# Patient Record
Sex: MALE | Race: WHITE | Employment: FULL TIME | URBAN - METROPOLITAN AREA
[De-identification: names, ages, dates, MRNs, and addresses within clinical notes are randomized per-mention and may not be internally consistent; named-entity substitution may affect disease eponyms.]

---

## 2017-03-27 ENCOUNTER — OFFICE VISIT (OUTPATIENT)
Dept: NEUROLOGY | Facility: CLINIC | Age: 52
End: 2017-03-27

## 2017-03-27 VITALS
WEIGHT: 156 LBS | HEIGHT: 66 IN | HEART RATE: 91 BPM | DIASTOLIC BLOOD PRESSURE: 80 MMHG | RESPIRATION RATE: 16 BRPM | SYSTOLIC BLOOD PRESSURE: 118 MMHG | OXYGEN SATURATION: 96 % | BODY MASS INDEX: 25.07 KG/M2

## 2017-03-27 DIAGNOSIS — M54.12 CERVICAL RADICULOPATHY: ICD-10-CM

## 2017-03-27 DIAGNOSIS — M75.21 BICEPS TENDONITIS ON RIGHT: ICD-10-CM

## 2017-03-27 DIAGNOSIS — M19.011 PRIMARY OSTEOARTHRITIS OF RIGHT SHOULDER: ICD-10-CM

## 2017-03-27 DIAGNOSIS — G89.29 CHRONIC RIGHT SHOULDER PAIN: Primary | ICD-10-CM

## 2017-03-27 DIAGNOSIS — M75.01 ADHESIVE CAPSULITIS OF RIGHT SHOULDER: ICD-10-CM

## 2017-03-27 DIAGNOSIS — M25.511 CHRONIC RIGHT SHOULDER PAIN: Primary | ICD-10-CM

## 2017-03-27 DIAGNOSIS — M75.111 INCOMPLETE TEAR OF RIGHT ROTATOR CUFF: ICD-10-CM

## 2017-03-27 PROBLEM — M75.101 ROTATOR CUFF TEAR, RIGHT: Status: ACTIVE | Noted: 2017-03-27

## 2017-03-27 PROCEDURE — 20611 DRAIN/INJ JOINT/BURSA W/US: CPT | Performed by: PHYSICAL MEDICINE & REHABILITATION

## 2017-03-27 PROCEDURE — 96372 THER/PROPH/DIAG INJ SC/IM: CPT | Performed by: PHYSICAL MEDICINE & REHABILITATION

## 2017-03-27 PROCEDURE — 99214 OFFICE O/P EST MOD 30 MIN: CPT | Performed by: PHYSICAL MEDICINE & REHABILITATION

## 2017-03-27 RX ORDER — TRIAMCINOLONE ACETONIDE 40 MG/ML
60 INJECTION, SUSPENSION INTRA-ARTICULAR; INTRAMUSCULAR ONCE
Status: COMPLETED | OUTPATIENT
Start: 2017-03-27 | End: 2017-03-27

## 2017-03-27 RX ORDER — LIDOCAINE HYDROCHLORIDE 10 MG/ML
2.5 INJECTION, SOLUTION INFILTRATION; PERINEURAL ONCE
Status: COMPLETED | OUTPATIENT
Start: 2017-03-27 | End: 2017-03-27

## 2017-03-27 RX ADMIN — TRIAMCINOLONE ACETONIDE 60 MG: 40 INJECTION, SUSPENSION INTRA-ARTICULAR; INTRAMUSCULAR at 18:21:00

## 2017-03-27 NOTE — PROGRESS NOTES
Cervical Pain H & P    Chief Complaint:  Patient presents with: Follow - Up: LOV 9/27/16. Pt doing follow up. Pt stated that back problem has gone completely away. Pt still has tingling in toes more on right. Pt is having right shoulder pain.  Right should pain is currently  0/10. The pain is described as a(n) dull sensation. · The patient reports no numbness. · The patient reports no tingling. · There is weakness in right arms. · The pain is worse right shoulder movement.   Especially with adbuction a Exercise Yes    Comment: walking daily     Social History Narrative    The patient does not use an assistive device. .      The patient does live in a home with stairs. Review of Systems      PE:   The patient does appear in his stated age in no Non-tender shoulder palpations.    Right Internal Rotation: moderate-severely limited   Left Internal Rotation: normal   Right External Rotation: moderate-severely limited   Left External Rotation: normal   Shoulder Special Tests: Right Sosa test: Positi

## 2017-03-27 NOTE — PROCEDURES
I did an ultrasound examination of the right shoulder in the office today:   Biceps Tendon:   Is moderately swollen proximally and there is moderate fluid around the tendon distal tot he biceps groove.   Power doppler imaging is positive on the lateral aspe

## 2017-03-27 NOTE — PATIENT INSTRUCTIONS
Refill policies:    • Allow 2 business days for refills; controlled substances may take longer.   • Contact your pharmacy at least 5 days prior to running out of medication and have them send an electronic request or submit request through the “request re your physician has recommended that you have a procedure or additional testing performed. [facility] will contact your insurance carrier to obtain pre-certification or prior authorization.     Unfortunately, ABRAHAM has seen an increas

## 2017-03-28 ENCOUNTER — TELEPHONE (OUTPATIENT)
Dept: NEUROLOGY | Facility: CLINIC | Age: 52
End: 2017-03-28

## 2017-03-28 NOTE — TELEPHONE ENCOUNTER
Called patient to advise insurance was verified and PT is a covered benefit and does not require authorization for initial evaluation, L/M for patient to call back with any questions or concerns

## 2017-03-29 ENCOUNTER — MED REC SCAN ONLY (OUTPATIENT)
Dept: NEUROLOGY | Facility: CLINIC | Age: 52
End: 2017-03-29

## 2017-04-03 ENCOUNTER — TELEPHONE (OUTPATIENT)
Dept: NEUROLOGY | Facility: CLINIC | Age: 52
End: 2017-04-03

## 2017-04-03 NOTE — TELEPHONE ENCOUNTER
Pt called to update Dr Jamil Griffith. Pt has 85% relief of symptoms. Pt has better ROM and less pain. Pt is pleased with right shoulder injection outcome. Dr Jamil Griffith updated.

## 2017-04-11 ENCOUNTER — HOSPITAL ENCOUNTER (OUTPATIENT)
Dept: GENERAL RADIOLOGY | Age: 52
Discharge: HOME | End: 2017-04-11
Attending: ORTHOPAEDIC SURGERY
Payer: COMMERCIAL

## 2017-04-11 DIAGNOSIS — M25.551 RIGHT HIP PAIN: ICD-10-CM

## 2017-04-11 PROCEDURE — 73502 X-RAY EXAM HIP UNI 2-3 VIEWS: CPT

## 2017-04-25 ENCOUNTER — TELEPHONE (OUTPATIENT)
Dept: NEUROLOGY | Facility: CLINIC | Age: 52
End: 2017-04-25

## 2017-04-25 NOTE — TELEPHONE ENCOUNTER
Spoke to patient who states he is doing well post right shoulder steroid injection. Almost complete relief. Patient is currently in Physical Therapy noting great response with improvement in strength and mobility.  Patient would like to know if he should pr

## 2017-07-11 ENCOUNTER — OFFICE VISIT (OUTPATIENT)
Dept: NEUROLOGY | Facility: CLINIC | Age: 52
End: 2017-07-11

## 2017-07-11 VITALS
BODY MASS INDEX: 25.71 KG/M2 | HEIGHT: 66 IN | SYSTOLIC BLOOD PRESSURE: 118 MMHG | DIASTOLIC BLOOD PRESSURE: 76 MMHG | RESPIRATION RATE: 16 BRPM | OXYGEN SATURATION: 96 % | HEART RATE: 100 BPM | WEIGHT: 160 LBS

## 2017-07-11 DIAGNOSIS — M75.01 ADHESIVE CAPSULITIS OF RIGHT SHOULDER: ICD-10-CM

## 2017-07-11 DIAGNOSIS — M16.11 PRIMARY OSTEOARTHRITIS OF RIGHT HIP: ICD-10-CM

## 2017-07-11 DIAGNOSIS — M75.111 INCOMPLETE TEAR OF RIGHT ROTATOR CUFF: ICD-10-CM

## 2017-07-11 DIAGNOSIS — G89.29 CHRONIC RIGHT SHOULDER PAIN: Primary | ICD-10-CM

## 2017-07-11 DIAGNOSIS — M19.011 PRIMARY OSTEOARTHRITIS OF RIGHT SHOULDER: ICD-10-CM

## 2017-07-11 DIAGNOSIS — M25.511 CHRONIC RIGHT SHOULDER PAIN: Primary | ICD-10-CM

## 2017-07-11 DIAGNOSIS — M25.551 RIGHT HIP PAIN: ICD-10-CM

## 2017-07-11 PROCEDURE — 99214 OFFICE O/P EST MOD 30 MIN: CPT | Performed by: PHYSICAL MEDICINE & REHABILITATION

## 2017-07-11 NOTE — PROGRESS NOTES
Cervical Pain H & P    Chief Complaint:  Patient presents with: Follow - Up: LOV 03/27/17 right shoulder steroid injection with 85% improvement with follow up physical therapy with improved range of motion.  Pt state that playing golf also increased ROM, P Nephew       Social History     Social History  Social History   Marital status:   Spouse name: N/A    Years of education: N/A  Number of children: N/A     Occupational History  None on file     Social History Main Topics   Smoking status: Current E Vascular upper extremity:   Right radial pulses: 2+   Left radial pulses: 2+      Neurological Upper Extremity:    Light Touch: Intact in Bilateral upper extremities. Pin Prick: Not tested. UE Muscle Strength:  All Upper Extremity strength measureme

## 2017-07-11 NOTE — PATIENT INSTRUCTIONS
Refill policies:    • Allow 2 business days for refills; controlled substances may take longer.   • Contact your pharmacy at least 5 days prior to running out of medication and have them send an electronic request or submit request through the “request re your physician has recommended that you have a procedure or additional testing performed. [facility] will contact your insurance carrier to obtain pre-certification or prior authorization.     Unfortunately, ABRAHAM has seen an increas

## 2017-08-07 ENCOUNTER — TELEPHONE (OUTPATIENT)
Dept: INTERNAL MEDICINE CLINIC | Facility: CLINIC | Age: 52
End: 2017-08-07

## 2017-08-07 NOTE — TELEPHONE ENCOUNTER
Tasked to phone room (rafael) to assist with scheduling appt with Dr Booker Ochoa for focus visit 8/8/17 @ 11:30am ADO right calf injury. Pts' wife Nadya Layman informed of appt time and verbalized understanding. Pt is driving home from work now.  8/14/17 appt cance

## 2017-08-07 NOTE — TELEPHONE ENCOUNTER
Actions Requested: pt only wants to see Dr. Adriana Louis. No openings until Fri.   Can he possibly be added Tues 08/08 after 11:30am?   Problem: injured rt calf  Onset and Timing: one week ago  Associated Symptoms: pt has pain and swelling in posterior rt calf a

## 2017-08-08 ENCOUNTER — HOSPITAL ENCOUNTER (OUTPATIENT)
Dept: ULTRASOUND IMAGING | Facility: HOSPITAL | Age: 52
Discharge: HOME | End: 2017-08-08
Attending: FAMILY MEDICINE
Payer: COMMERCIAL

## 2017-08-08 ENCOUNTER — OFFICE VISIT (OUTPATIENT)
Dept: FAMILY MEDICINE CLINIC | Facility: CLINIC | Age: 52
End: 2017-08-08

## 2017-08-08 VITALS
DIASTOLIC BLOOD PRESSURE: 85 MMHG | HEIGHT: 66 IN | TEMPERATURE: 98 F | BODY MASS INDEX: 26.03 KG/M2 | SYSTOLIC BLOOD PRESSURE: 124 MMHG | WEIGHT: 162 LBS | HEART RATE: 87 BPM

## 2017-08-08 DIAGNOSIS — M79.89 CALF SWELLING: ICD-10-CM

## 2017-08-08 DIAGNOSIS — M79.89 CALF SWELLING: Primary | ICD-10-CM

## 2017-08-08 DIAGNOSIS — M79.89 RIGHT LEG SWELLING: ICD-10-CM

## 2017-08-08 PROCEDURE — 99214 OFFICE O/P EST MOD 30 MIN: CPT | Performed by: FAMILY MEDICINE

## 2017-08-08 PROCEDURE — 93971 EXTREMITY STUDY: CPT | Performed by: FAMILY MEDICINE

## 2017-08-08 PROCEDURE — 99212 OFFICE O/P EST SF 10 MIN: CPT | Performed by: FAMILY MEDICINE

## 2017-08-08 NOTE — PROGRESS NOTES
Patient ID: Vincent Caro is a 45year old male. HPI  Patient presents with:  Swelling: right leg     Actions Requested: pt only wants to see Dr. Yosvany Cota. No openings until Fri.   Can he possibly be added Tues 08/08 after 11:30am?   Problem: injured tendon tear\"   • Lower back pain        Past Surgical History:  2007: Sami Narayan OF SHOULDER  6/2014: BACK SURGERY      Comment: cortisone injection  No date: MUSCULOSKELETAL SURGERY UNLISTED      Comment: per [employer]:  \"repair (of bicep and tendon and patient/family member understands and agrees to plan.          Mina Kelly DO  8/8/2017

## 2017-08-14 ENCOUNTER — TELEPHONE (OUTPATIENT)
Dept: FAMILY MEDICINE CLINIC | Facility: CLINIC | Age: 52
End: 2017-08-14

## 2017-08-14 NOTE — TELEPHONE ENCOUNTER
Actions Requested: Pain med/advice  Problem: leg pain from muscle strain  Onset and Timing: yesterday  Associated Symptoms: pain in right glute/hamstring  Aggravating by: standing, walking  Alleviated by: n/a  Triage Note: Pt states he saw Dr Joel Naranjo on 8/8

## 2017-08-22 ENCOUNTER — OFFICE VISIT (OUTPATIENT)
Dept: FAMILY MEDICINE CLINIC | Facility: CLINIC | Age: 52
End: 2017-08-22

## 2017-08-22 VITALS
BODY MASS INDEX: 26.39 KG/M2 | WEIGHT: 164.19 LBS | HEART RATE: 80 BPM | DIASTOLIC BLOOD PRESSURE: 82 MMHG | SYSTOLIC BLOOD PRESSURE: 120 MMHG | RESPIRATION RATE: 12 BRPM | HEIGHT: 66 IN | TEMPERATURE: 98 F

## 2017-08-22 DIAGNOSIS — I82.401 LEG DVT (DEEP VENOUS THROMBOEMBOLISM), ACUTE, RIGHT (HCC): Primary | ICD-10-CM

## 2017-08-22 DIAGNOSIS — S76.311A RIGHT HAMSTRING MUSCLE STRAIN, INITIAL ENCOUNTER: ICD-10-CM

## 2017-08-22 DIAGNOSIS — M71.21 BAKER CYST, RIGHT: ICD-10-CM

## 2017-08-22 PROCEDURE — 99214 OFFICE O/P EST MOD 30 MIN: CPT | Performed by: FAMILY MEDICINE

## 2017-08-22 PROCEDURE — 99212 OFFICE O/P EST SF 10 MIN: CPT | Performed by: FAMILY MEDICINE

## 2017-08-22 NOTE — PROGRESS NOTES
Patient ID: Maxine Sauceda is a 45year old male. HPI  Patient presents with: Follow - Up: blood clot right leg    Interpretation   PROCEDURE:            US VENOUS DOPPLER LEG RIGHT-DIAG IMG (CPT=93971)     COMPARISON:           None.      INDICATION 3.2 oz (74.5 kg)  08/08/17 : 162 lb (73.5 kg)  07/11/17 : 160 lb (72.6 kg)  03/27/17 : 156 lb (70.8 kg)  09/27/16 : 162 lb (73.5 kg)  07/12/16 : 162 lb 12.8 oz (73.8 kg)      Body mass index is 26.5 kg/m².     BP Readings from Last 6 Encounters:  08/22/17 : issue tuberosity itself. ASSESSMENT/PLAN:     Diagnoses and all orders for this visit:    Leg DVT (deep venous thromboembolism), acute, right (HCC)  -     Rivaroxaban (XARELTO) 20 MG Oral Tab; Take 1 tablet (20 mg total) by mouth daily with food.   He will

## 2017-11-16 ENCOUNTER — HOSPITAL ENCOUNTER (OUTPATIENT)
Dept: ULTRASOUND IMAGING | Facility: HOSPITAL | Age: 52
Discharge: HOME | End: 2017-11-16
Attending: FAMILY MEDICINE
Payer: COMMERCIAL

## 2017-11-16 DIAGNOSIS — I82.4Y1 DEEP VEIN THROMBOSIS (DVT) OF PROXIMAL VEIN OF RIGHT LOWER EXTREMITY, UNSPECIFIED CHRONICITY (HCC): ICD-10-CM

## 2017-11-16 PROCEDURE — 93971 EXTREMITY STUDY: CPT | Performed by: FAMILY MEDICINE

## 2017-11-24 ENCOUNTER — OFFICE VISIT (OUTPATIENT)
Dept: FAMILY MEDICINE CLINIC | Facility: CLINIC | Age: 52
End: 2017-11-24

## 2017-11-24 VITALS
HEIGHT: 66 IN | DIASTOLIC BLOOD PRESSURE: 71 MMHG | SYSTOLIC BLOOD PRESSURE: 109 MMHG | HEART RATE: 76 BPM | RESPIRATION RATE: 12 BRPM | TEMPERATURE: 98 F | BODY MASS INDEX: 27.74 KG/M2 | WEIGHT: 172.63 LBS

## 2017-11-24 DIAGNOSIS — I82.401 LEG DVT (DEEP VENOUS THROMBOEMBOLISM), ACUTE, RIGHT (HCC): Primary | ICD-10-CM

## 2017-11-24 DIAGNOSIS — M71.21 BAKER CYST, RIGHT: ICD-10-CM

## 2017-11-24 DIAGNOSIS — Z23 NEED FOR VACCINATION: ICD-10-CM

## 2017-11-24 PROCEDURE — 99212 OFFICE O/P EST SF 10 MIN: CPT | Performed by: FAMILY MEDICINE

## 2017-11-24 PROCEDURE — 90471 IMMUNIZATION ADMIN: CPT | Performed by: FAMILY MEDICINE

## 2017-11-24 PROCEDURE — 99214 OFFICE O/P EST MOD 30 MIN: CPT | Performed by: FAMILY MEDICINE

## 2017-11-24 PROCEDURE — 90715 TDAP VACCINE 7 YRS/> IM: CPT | Performed by: FAMILY MEDICINE

## 2017-11-24 NOTE — PROGRESS NOTES
Patient ID: Missy Padilla is a 45year old male.     HPI  Patient presents with:  Test Results: ultrasound done on 11-16-17  Pain: right lower leg     Interpretation   PROCEDURE:  US VENOUS DOPPLER LEG RIGHT-DIAG [facility] (CPT=93971)     COMPARISON: Maxine 27.86 kg/m².     BP Readings from Last 6 Encounters:  11/24/17 : 109/71  08/22/17 : 120/82  08/08/17 : 124/85  07/11/17 : 118/76  03/27/17 : 118/80  09/27/16 : 120/80        Review of Systems      Past Medical History:   Diagnosis Date   • Arthritis    • In he has but he may need some hypercoagulability workup but I will leave this up to the hematologist.  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   He does relate that he had right hip surg

## 2017-11-30 ENCOUNTER — OFFICE VISIT (OUTPATIENT)
Dept: HEMATOLOGY/ONCOLOGY | Facility: HOSPITAL | Age: 52
End: 2017-11-30
Attending: INTERNAL MEDICINE
Payer: COMMERCIAL

## 2017-11-30 VITALS
WEIGHT: 169 LBS | RESPIRATION RATE: 18 BRPM | SYSTOLIC BLOOD PRESSURE: 132 MMHG | HEIGHT: 66 IN | DIASTOLIC BLOOD PRESSURE: 88 MMHG | TEMPERATURE: 98 F | HEART RATE: 88 BPM | BODY MASS INDEX: 27.16 KG/M2

## 2017-11-30 DIAGNOSIS — I82.401 LEG DVT (DEEP VENOUS THROMBOEMBOLISM), ACUTE, RIGHT (HCC): ICD-10-CM

## 2017-11-30 DIAGNOSIS — M15.9 PRIMARY OSTEOARTHRITIS INVOLVING MULTIPLE JOINTS: Primary | ICD-10-CM

## 2017-11-30 PROCEDURE — 99212 OFFICE O/P EST SF 10 MIN: CPT | Performed by: INTERNAL MEDICINE

## 2017-11-30 PROCEDURE — 99244 OFF/OP CNSLTJ NEW/EST MOD 40: CPT | Performed by: INTERNAL MEDICINE

## 2017-11-30 RX ORDER — FLUTICASONE PROPIONATE 50 MCG
SPRAY, SUSPENSION (ML) NASAL DAILY
COMMUNITY
End: 2018-01-15

## 2017-11-30 RX ORDER — COVID-19 ANTIGEN TEST
220 KIT MISCELLANEOUS
COMMUNITY
End: 2018-04-18

## 2017-11-30 NOTE — PROGRESS NOTES
Hematology Consultation Note    Patient Name: Abigail Zaldivar   YOB: 1965   Medical Record Number: [id number]   CSN: [id number]   Consulting Physician: Jenny Blount MD  Referring Physician(s): Francisco Leach  Date of Consultation: 11/30/2017 dyspnea, chest pain, change in bowel or bladder habits. Patient has significant osteoarthritis in several joints mentions using Tylenol as needed when he is on Xarelto therapy secondary to increased bleeding risk with use of NSAIDs.     Past Medical Histor file     Other Topics Concern    Caffeine Concern Yes    Comment: 2 cups coffee daily    Sleep Concern Yes    Exercise Yes    Comment: walking daily     Social History Narrative    Mirlande Joseph is  x 31 yrs; Higinio Avery. Father of 2 adult daughters.  He works as a intact. Oropharynx is clear. Neck:  No palpable lymphadenopathy. Neck is supple. Lymphatics: There is no palpable lymphadenopathy throughout in the cervical, supraclavicular, axillary, or inguinal regions. Chest: Clear to auscultation.  No wheezes or ra (VTE)    --Reviewed history with patient which shows this clot was unprovoked  --Therefore recommending the patient is treated with at least 6 months of anticoagulation.     --Recommending he restarts rivaroxaban (Xarelto) 20 mg daily for an additional 3 mo family. Jose Luis Díaz MD  [facility]  [address], [facility], [facility]

## 2017-12-19 ENCOUNTER — TELEPHONE (OUTPATIENT)
Dept: NEUROLOGY | Facility: CLINIC | Age: 52
End: 2017-12-19

## 2017-12-19 NOTE — TELEPHONE ENCOUNTER
Per Dr Niya Chow order placed for tramadol 50mg q 8hr prn pain #[id number] 0 refill. Per Dr Niya Chow pt was told that if right shoulder pain worsen and he would like injection Dr Niya Chow may be able to do while still on Xarelto. Pt will call if injection needed.  Pt was

## 2017-12-19 NOTE — TELEPHONE ENCOUNTER
Pt recalled. Pt asking if Dr Rosie Victor can order pain med for right shoulder. Pt would like to have right shoulder injection but has been placed on Xarelto for second time for RLE DVT.  Pt is unable to come off Xarelto at this time so is unable to received an i

## 2017-12-20 RX ORDER — TRAMADOL HYDROCHLORIDE 50 MG/1
50 TABLET ORAL EVERY 8 HOURS PRN
Qty: 90 TABLET | Refills: 0 | OUTPATIENT
Start: 2017-12-20 | End: 2018-04-18

## 2018-01-13 ENCOUNTER — PATIENT MESSAGE (OUTPATIENT)
Dept: FAMILY MEDICINE CLINIC | Facility: CLINIC | Age: 53
End: 2018-01-13

## 2018-01-16 RX ORDER — FLUTICASONE PROPIONATE 50 MCG
2 SPRAY, SUSPENSION (ML) NASAL DAILY
Qty: 3 INHALER | Refills: 0 | Status: SHIPPED | OUTPATIENT
Start: 2018-01-16 | End: 2018-05-08

## 2018-01-16 NOTE — TELEPHONE ENCOUNTER
From: Trang Stover  To: Alesia Zhang DO  Sent: 1/13/2018 8:52 AM CST  Subject: Prescription Question    Hi Dr. Morenita Arora I am in need of my prescription for my nose spray - Fluticasone Propionate 50mcg/ACT Nasal Suspension.  If you could send the prescr

## 2018-01-16 NOTE — TELEPHONE ENCOUNTER
Dr. Dell Szymanski, please see pt's request for refill and pended med. Med is listed as historical on MAR.

## 2018-02-09 ENCOUNTER — HOSPITAL ENCOUNTER (OUTPATIENT)
Dept: ULTRASOUND IMAGING | Facility: HOSPITAL | Age: 53
Discharge: HOME | End: 2018-02-09
Attending: INTERNAL MEDICINE
Payer: COMMERCIAL

## 2018-02-09 DIAGNOSIS — I82.401 LEG DVT (DEEP VENOUS THROMBOEMBOLISM), ACUTE, RIGHT (HCC): ICD-10-CM

## 2018-02-09 PROCEDURE — 93971 EXTREMITY STUDY: CPT | Performed by: INTERNAL MEDICINE

## 2018-02-28 ENCOUNTER — OFFICE VISIT (OUTPATIENT)
Dept: HEMATOLOGY/ONCOLOGY | Facility: HOSPITAL | Age: 53
End: 2018-02-28
Attending: INTERNAL MEDICINE
Payer: COMMERCIAL

## 2018-02-28 ENCOUNTER — TELEPHONE (OUTPATIENT)
Dept: NEUROLOGY | Facility: CLINIC | Age: 53
End: 2018-02-28

## 2018-02-28 VITALS
HEART RATE: 80 BPM | SYSTOLIC BLOOD PRESSURE: 122 MMHG | WEIGHT: 168 LBS | HEIGHT: 66 IN | TEMPERATURE: 98 F | RESPIRATION RATE: 16 BRPM | BODY MASS INDEX: 27 KG/M2 | DIASTOLIC BLOOD PRESSURE: 78 MMHG

## 2018-02-28 DIAGNOSIS — M25.511 CHRONIC RIGHT SHOULDER PAIN: Primary | ICD-10-CM

## 2018-02-28 DIAGNOSIS — M19.011 PRIMARY OSTEOARTHRITIS OF RIGHT SHOULDER: ICD-10-CM

## 2018-02-28 DIAGNOSIS — M75.111 INCOMPLETE TEAR OF RIGHT ROTATOR CUFF: ICD-10-CM

## 2018-02-28 DIAGNOSIS — I82.401 LEG DVT (DEEP VENOUS THROMBOEMBOLISM), ACUTE, RIGHT (HCC): ICD-10-CM

## 2018-02-28 DIAGNOSIS — G89.29 CHRONIC RIGHT SHOULDER PAIN: Primary | ICD-10-CM

## 2018-02-28 PROCEDURE — 99214 OFFICE O/P EST MOD 30 MIN: CPT | Performed by: INTERNAL MEDICINE

## 2018-02-28 NOTE — TELEPHONE ENCOUNTER
Pt came in requesting a cortisone shot in his right shoulder, wanting to schedule an injection please advise

## 2018-02-28 NOTE — TELEPHONE ENCOUNTER
Patient states he is having right shoulder pain with limited mobility which worsened around Rene. Pain score of 10/10 that is intermittent especially when laying.    Taking otc Tylenol prn  LOV 7/11/17  Had right shoulder injection on 3/27/17 with 80%

## 2018-02-28 NOTE — PROGRESS NOTES
Hematology Progress Note    Patient Name: Ru Telles   YOB: 1965   Medical Record Number: [id number]   CSN: [id number]   Consulting Physician: Jorge Ritchie MD  Referring Physician(s): Lavern Saavedra  Date of Visit: 02/28/2018       [facility] pain, change in bowel or bladder habits. Patient has significant osteoarthritis in several joints mentions using Tylenol as needed when he is on Xarelto therapy secondary to increased bleeding risk with use of NSAIDs. Interval History:   Arsalan Fang presents t Number of children: N/A     Occupational History  None on file     Social History Main Topics   Smoking status: Current Every Day Smoker     Types: Cigars    Smokeless tobacco: Former User    Types: Chew    Comment: 1 cigar a day.  quit chewing tobacco in 2 review of systems was completed. Pertinent positives and negatives noted in the the HPI.      Vital Signs:  /78 (BP Location: Left arm, Patient Position: Sitting, Cuff Size: adult)   Pulse 80   Temp 98.2 °F (36.8 °C) (Oral)   Resp 16   Ht 1.676 m (5' nearly occlusive thrombus involving the midportion of a single of 2 paired right peroneal veins. This segment demonstrates incomplete compression. The second right peroneal vein is patent.  The femoral and popliteal veins   appear normal.  Normal flow was d weeks to pursue accurate testing    --Patient has been tolerating anticoagulation with Xarelto quite well, mild bruising associated with carpentry work.   We have refilled his prescription of rivaroxaban   --Informed the patient that he may hold his Xarelto

## 2018-03-01 NOTE — TELEPHONE ENCOUNTER
Patient informed of Dr. Javier Rizzo message verbalizing understanding and was transferred to the front office staff to schedule appt.

## 2018-03-01 NOTE — TELEPHONE ENCOUNTER
Called [facility] BS for authorization of approval of right shoulder injection cpt codes 55295, [id number], [id number]. Talked to Annemarie Mitchell Dr. who states no authorization is required. Reference # [id number] Will  inform Nursing.

## 2018-04-18 ENCOUNTER — OFFICE VISIT (OUTPATIENT)
Dept: NEUROLOGY | Facility: CLINIC | Age: 53
End: 2018-04-18

## 2018-04-18 ENCOUNTER — TELEPHONE (OUTPATIENT)
Dept: NEUROLOGY | Facility: CLINIC | Age: 53
End: 2018-04-18

## 2018-04-18 VITALS
HEIGHT: 66 IN | BODY MASS INDEX: 26.52 KG/M2 | DIASTOLIC BLOOD PRESSURE: 80 MMHG | RESPIRATION RATE: 16 BRPM | WEIGHT: 165 LBS | HEART RATE: 88 BPM | SYSTOLIC BLOOD PRESSURE: 102 MMHG

## 2018-04-18 VITALS
WEIGHT: 165 LBS | RESPIRATION RATE: 16 BRPM | DIASTOLIC BLOOD PRESSURE: 72 MMHG | BODY MASS INDEX: 27 KG/M2 | HEART RATE: 72 BPM | SYSTOLIC BLOOD PRESSURE: 106 MMHG

## 2018-04-18 DIAGNOSIS — M19.011 PRIMARY OSTEOARTHRITIS OF RIGHT SHOULDER: ICD-10-CM

## 2018-04-18 DIAGNOSIS — G89.29 CHRONIC RIGHT SHOULDER PAIN: Primary | ICD-10-CM

## 2018-04-18 DIAGNOSIS — M75.111 INCOMPLETE TEAR OF RIGHT ROTATOR CUFF: ICD-10-CM

## 2018-04-18 DIAGNOSIS — M25.511 CHRONIC RIGHT SHOULDER PAIN: Primary | ICD-10-CM

## 2018-04-18 DIAGNOSIS — M75.01 ADHESIVE CAPSULITIS OF RIGHT SHOULDER: ICD-10-CM

## 2018-04-18 DIAGNOSIS — I82.401 LEG DVT (DEEP VENOUS THROMBOEMBOLISM), ACUTE, RIGHT (HCC): ICD-10-CM

## 2018-04-18 DIAGNOSIS — M75.21 BICEPS TENDONITIS ON RIGHT: ICD-10-CM

## 2018-04-18 DIAGNOSIS — G56.21 ULNAR NEUROPATHY OF RIGHT UPPER EXTREMITY: ICD-10-CM

## 2018-04-18 DIAGNOSIS — M54.12 CERVICAL RADICULOPATHY: ICD-10-CM

## 2018-04-18 PROBLEM — M75.101 ROTATOR CUFF TEAR, RIGHT: Status: RESOLVED | Noted: 2017-03-27 | Resolved: 2018-04-18

## 2018-04-18 PROCEDURE — 99214 OFFICE O/P EST MOD 30 MIN: CPT | Performed by: PHYSICAL MEDICINE & REHABILITATION

## 2018-04-18 PROCEDURE — 20611 DRAIN/INJ JOINT/BURSA W/US: CPT | Performed by: PHYSICAL MEDICINE & REHABILITATION

## 2018-04-18 RX ORDER — TRIAMCINOLONE ACETONIDE 40 MG/ML
60 INJECTION, SUSPENSION INTRA-ARTICULAR; INTRAMUSCULAR ONCE
Status: COMPLETED | OUTPATIENT
Start: 2018-04-18 | End: 2018-04-18

## 2018-04-18 RX ORDER — LIDOCAINE HYDROCHLORIDE 10 MG/ML
2.5 INJECTION, SOLUTION INFILTRATION; PERINEURAL ONCE
Status: COMPLETED | OUTPATIENT
Start: 2018-04-18 | End: 2018-04-18

## 2018-04-18 RX ORDER — ACETAMINOPHEN 325 MG/1
325 TABLET ORAL 2 TIMES DAILY PRN
COMMUNITY
End: 2020-01-06

## 2018-04-18 NOTE — PROGRESS NOTES
Cervical Pain H & P    Chief Complaint:  Patient presents with:  Shoulder Pain: Patient presents for follow up on right shoulder pain, states the pain radiates to his right fingers.  Patient states the pain is worse at night, notices numbness and tingling i Surgical History:  2007: Lavell Renae OF SHOULDER  6/2014: BACK SURGERY      Comment: cortisone injection  05/09/2016: [facility] CLOSED [place] POST HIP ARTHROPLASTY [address] Right  No date: MUSCULOSKELETAL SURGERY UNLISTED      Comment: per [employer]:  Lynn Valdivia ( patient is alert and oriented x 3. The patient has a normal affect and mood. Respiratory:  No acute respiratory distress. Patient does not have a cough. HEENT:  Extraocular muscles are intact. There is no kern icterus.  Pupils are equal, round, and today.  (see procedure note)    The patient will continue with his home exercise program.    I will hold on doing an EMG/NCS of the right arm for now, but he might need this in the future.     The patient will follow up in 2-3 months, but the patient will c

## 2018-04-18 NOTE — PROCEDURES
The patient is here for a right shoulder injection done under ultrasound guidance. Under ultrasound guidance the right posterior glenohumeral joint was identified and a ghazala was placed on the patient's skin. The skin was cleaned with alcohol swabs x 3 and

## 2018-04-18 NOTE — TELEPHONE ENCOUNTER
[address] [place] [address] for authorization of approval of right shoulder injection cpt code 01037, [id number]. Talked to [facility]. who states no authorization is required. Reference # [id number]. Will  inform Nursing.  Pt. Is scheduled for procedure to

## 2018-04-18 NOTE — PATIENT INSTRUCTIONS
As of October 6th 2014, the Drug Enforcement Agency [facility]) is reclassifying all hydrocodone combination medications from Schedule III to Schedule II. This includes medications such as Norco, Vicodin, Lortab, Zohydro, and Vicoprofen.     What this means for y will do a right shoulder injection once I have insurance approval.    The patient will continue with his home exercise program.    I will hold on doing an EMG/NCS of the right arm for now, but he might need this in the future.     The patient will follow up

## 2018-04-19 ENCOUNTER — MED REC SCAN ONLY (OUTPATIENT)
Dept: NEUROLOGY | Facility: CLINIC | Age: 53
End: 2018-04-19

## 2018-05-08 RX ORDER — FLUTICASONE PROPIONATE 50 MCG
SPRAY, SUSPENSION (ML) NASAL
Qty: 48 G | Refills: 0 | Status: SHIPPED | OUTPATIENT
Start: 2018-05-08 | End: 2018-08-01

## 2018-05-08 NOTE — TELEPHONE ENCOUNTER
Refill Protocol Appointment Criteria  · Appointment scheduled in the past 12 months or in the next 3 months  Recent Outpatient Visits            2 weeks ago Chronic right shoulder pain    JAMILA Carmichael, [address], [address] [facility], [facility]

## 2018-05-31 DIAGNOSIS — I82.401 LEG DVT (DEEP VENOUS THROMBOEMBOLISM), ACUTE, RIGHT (HCC): ICD-10-CM

## 2018-05-31 RX ORDER — RIVAROXABAN 20 MG/1
TABLET, FILM COATED ORAL
Qty: 90 TABLET | Refills: 0 | Status: SHIPPED | OUTPATIENT
Start: 2018-05-31 | End: 2018-08-28 | Stop reason: ALTCHOICE

## 2018-08-01 RX ORDER — FLUTICASONE PROPIONATE 50 MCG
SPRAY, SUSPENSION (ML) NASAL
Qty: 48 G | Refills: 0 | Status: SHIPPED | OUTPATIENT
Start: 2018-08-01 | End: 2020-01-06

## 2018-08-02 NOTE — TELEPHONE ENCOUNTER
Refill Protocol Appointment Criteria  · Appointment scheduled in the past 12 months or in the next 3 months  Recent Outpatient Visits            3 months ago Chronic right shoulder pain    JAMILA Carmichael, [address], [address]

## 2018-08-20 ENCOUNTER — HOSPITAL ENCOUNTER (OUTPATIENT)
Dept: ULTRASOUND IMAGING | Facility: HOSPITAL | Age: 53
Discharge: HOME | End: 2018-08-20
Attending: INTERNAL MEDICINE
Payer: COMMERCIAL

## 2018-08-20 DIAGNOSIS — I82.401 LEG DVT (DEEP VENOUS THROMBOEMBOLISM), ACUTE, RIGHT (HCC): ICD-10-CM

## 2018-08-20 PROCEDURE — 93971 EXTREMITY STUDY: CPT | Performed by: INTERNAL MEDICINE

## 2018-08-28 ENCOUNTER — OFFICE VISIT (OUTPATIENT)
Dept: HEMATOLOGY/ONCOLOGY | Facility: HOSPITAL | Age: 53
End: 2018-08-28
Attending: INTERNAL MEDICINE
Payer: COMMERCIAL

## 2018-08-28 VITALS
HEART RATE: 87 BPM | WEIGHT: 172 LBS | BODY MASS INDEX: 27.64 KG/M2 | RESPIRATION RATE: 16 BRPM | TEMPERATURE: 99 F | SYSTOLIC BLOOD PRESSURE: 111 MMHG | DIASTOLIC BLOOD PRESSURE: 88 MMHG | HEIGHT: 66 IN

## 2018-08-28 DIAGNOSIS — I82.401 LEG DVT (DEEP VENOUS THROMBOEMBOLISM), ACUTE, RIGHT (HCC): Primary | ICD-10-CM

## 2018-08-28 DIAGNOSIS — M15.9 PRIMARY OSTEOARTHRITIS INVOLVING MULTIPLE JOINTS: ICD-10-CM

## 2018-08-28 PROCEDURE — 99214 OFFICE O/P EST MOD 30 MIN: CPT | Performed by: INTERNAL MEDICINE

## 2018-08-28 NOTE — PATIENT INSTRUCTIONS
PLEASE GO FOR BLOOD TESTING ON 9/11/18; DO NOT HAVE LABS DRAWN ANY SOONER AS THEY MAY BE FALSELY AFFECTED BY RECENT XARELTO USE

## 2018-08-28 NOTE — PROGRESS NOTES
Hematology Progress Note    Patient Name: Venkat Villar   YOB: 1965   Medical Record Number: [id number]   CSN: [id number]   Consulting Physician: Milady Luna MD  Referring Physician(s): Gino Kerr  Date of Visit: 08/28/2018       [facility] pain, change in bowel or bladder habits. Patient has significant osteoarthritis in several joints mentions using Tylenol as needed when he is on Xarelto therapy secondary to increased bleeding risk with use of NSAIDs. Interval History:   Fide Frederick presents t status:   Spouse name: N/A    Years of education: N/A  Number of children: N/A     Occupational History  None on file     Social History Main Topics   Smoking status: Current Every Day Smoker     Types: Cigars    Smokeless tobacco: Former User    Ty +Osteoarthritis affecting both hands and shoulder regions  Neurological: Negative for headaches, dizziness, speech problems, gait problems and weakness. A comprehensive 14 point review of systems was completed.   Pertinent positives and negatives noted i 07:20 AM   CO2 31 03/14/2015 07:20 AM   AST 32 03/14/2015 07:20 AM   ALT 34 03/14/2015 07:20 AM     U/S venous doppler right leg 8/20/18    FINDINGS:        The femoral and popliteal veins appear normal.  Normal flow was demonstrated with color and pulsed testing    --Patient has been tolerating anticoagulation with Xarelto quite well, mild bruising associated with carpentry work, but no major bleeding complications. Pt should be able to use this drug in the future if needed.       --Discussed with Ronn guerra

## 2018-08-29 RX ORDER — TRAMADOL HYDROCHLORIDE 50 MG/1
50 TABLET ORAL EVERY 8 HOURS PRN
Qty: 90 TABLET | Refills: 0
Start: 2018-08-29

## 2018-08-29 NOTE — TELEPHONE ENCOUNTER
From: Basil Paredes  Sent: 8/29/2018 12:55 PM CDT  Subject: Medication Renewal Request    Dewayne Doty. Opaldena would like a refill of the following medications:     TraMADol HCl 50 MG Oral Tab [Jerad Feliz MD]    Preferred pharmacy: [address] [facility]    Comment:

## 2018-08-29 NOTE — TELEPHONE ENCOUNTER
From: Sara Melgar  Sent: 8/29/2018 12:41 PM CDT  Subject: Medication Renewal Request    David Moore. Julia would like a refill of the following medications:     TraMADol HCl 50 MG Oral Tab [Jerad Feliz MD]    Preferred pharmacy: [address] [place] [address]

## 2018-08-29 NOTE — TELEPHONE ENCOUNTER
Refill request for tramadol 50 mg, take 1 tab every 8 hrs as needed, #90, no refills    LOV: 4/18/18  NOV: none  [facility]

## 2018-08-30 RX ORDER — TRAMADOL HYDROCHLORIDE 50 MG/1
50 TABLET ORAL EVERY 8 HOURS PRN
Qty: 90 TABLET | Refills: 0 | OUTPATIENT
Start: 2018-08-30 | End: 2020-01-06

## 2018-08-31 NOTE — TELEPHONE ENCOUNTER
Contacted patient and informed him Dr. Carmen Bourgeois would like for him to schedule follow up appt. Patient verbalized understanding and transferred to the front office staff to schedule.  Patient request rx be called in to [facility] on file    Tramadol 50mg q8h prn elayne

## 2018-09-11 ENCOUNTER — LAB ENCOUNTER (OUTPATIENT)
Dept: LAB | Facility: HOSPITAL | Age: 53
End: 2018-09-11
Attending: INTERNAL MEDICINE
Payer: COMMERCIAL

## 2018-09-11 DIAGNOSIS — I82.401 LEG DVT (DEEP VENOUS THROMBOEMBOLISM), ACUTE, RIGHT (HCC): ICD-10-CM

## 2018-09-11 DIAGNOSIS — M15.9 PRIMARY OSTEOARTHRITIS INVOLVING MULTIPLE JOINTS: ICD-10-CM

## 2018-09-11 LAB — F2 C.20210G>A GENO BLD/T: NORMAL

## 2018-09-11 PROCEDURE — 81240 F2 GENE: CPT

## 2018-09-11 PROCEDURE — 85303 CLOT INHIBIT PROT C ACTIVITY: CPT

## 2018-09-11 PROCEDURE — 86146 BETA-2 GLYCOPROTEIN ANTIBODY: CPT

## 2018-09-11 PROCEDURE — 85730 THROMBOPLASTIN TIME PARTIAL: CPT

## 2018-09-11 PROCEDURE — 85610 PROTHROMBIN TIME: CPT

## 2018-09-11 PROCEDURE — 85613 RUSSELL VIPER VENOM DILUTED: CPT

## 2018-09-11 PROCEDURE — 36415 COLL VENOUS BLD VENIPUNCTURE: CPT

## 2018-09-11 PROCEDURE — 85390 FIBRINOLYSINS SCREEN I&R: CPT

## 2018-09-11 PROCEDURE — 85598 HEXAGNAL PHOSPH PLTLT NEUTRL: CPT

## 2018-09-11 PROCEDURE — 81241 F5 GENE: CPT

## 2018-09-11 PROCEDURE — 85300 ANTITHROMBIN III ACTIVITY: CPT

## 2018-09-11 PROCEDURE — 85306 CLOT INHIBIT PROT S FREE: CPT

## 2018-09-11 PROCEDURE — 86147 CARDIOLIPIN ANTIBODY EA IG: CPT

## 2018-09-12 LAB
APTT PPP: 30.6 SECONDS (ref 23.2–35.3)
CONFIRM APTT STACLOT: NEGATIVE
CONFIRM DRVVT: 1.1 S (ref 0–1.1)
PROTHROMBIN TIME: 13.9 SECONDS (ref 11.8–14.5)

## 2018-09-13 LAB
CARDIOLIPIN IGG SERPL-ACNC: 2.6 GPL (ref 0–14.9)
CARDIOLIPIN IGM SERPL-ACNC: 8.1 MPL (ref 0–12.4)

## 2018-09-14 LAB
AT III ACT/NOR PPP CHRO: 118 % NHP (ref 90–122)
BETA 2 GLYCOPROTEIN 1 AB, IGG: <3.7 U/ML (ref ?–15)
BETA 2 GLYCOPROTEIN 1 AB, IGM: 7.2 U/ML (ref ?–15)
PROT C ACT/NOR PPP: 138 % (ref 67–194)
PROT S ACT/NOR PPP: 116 % (ref 48–153)

## 2018-12-03 ENCOUNTER — OFFICE VISIT (OUTPATIENT)
Dept: NEUROLOGY | Facility: CLINIC | Age: 53
End: 2018-12-03
Payer: COMMERCIAL

## 2018-12-03 ENCOUNTER — TELEPHONE (OUTPATIENT)
Dept: NEUROLOGY | Facility: CLINIC | Age: 53
End: 2018-12-03

## 2018-12-03 VITALS
BODY MASS INDEX: 27.32 KG/M2 | HEART RATE: 86 BPM | SYSTOLIC BLOOD PRESSURE: 116 MMHG | WEIGHT: 170 LBS | DIASTOLIC BLOOD PRESSURE: 80 MMHG | HEIGHT: 66 IN

## 2018-12-03 DIAGNOSIS — M75.01 ADHESIVE CAPSULITIS OF RIGHT SHOULDER: ICD-10-CM

## 2018-12-03 DIAGNOSIS — M19.011 PRIMARY OSTEOARTHRITIS OF RIGHT SHOULDER: ICD-10-CM

## 2018-12-03 DIAGNOSIS — M75.111 INCOMPLETE TEAR OF RIGHT ROTATOR CUFF: ICD-10-CM

## 2018-12-03 DIAGNOSIS — M43.16 SPONDYLOLISTHESIS OF LUMBAR REGION: ICD-10-CM

## 2018-12-03 DIAGNOSIS — M54.12 CERVICAL RADICULOPATHY: ICD-10-CM

## 2018-12-03 DIAGNOSIS — M51.9 LUMBAR DISC DISEASE: ICD-10-CM

## 2018-12-03 DIAGNOSIS — M43.10 RETROLISTHESIS OF VERTEBRAE: ICD-10-CM

## 2018-12-03 DIAGNOSIS — M54.16 LUMBAR RADICULOPATHY: Primary | ICD-10-CM

## 2018-12-03 DIAGNOSIS — M48.061 LUMBAR FORAMINAL STENOSIS: ICD-10-CM

## 2018-12-03 PROCEDURE — 99215 OFFICE O/P EST HI 40 MIN: CPT | Performed by: PHYSICAL MEDICINE & REHABILITATION

## 2018-12-03 NOTE — PROGRESS NOTES
Low Back Pain H & P    Chief Complaint: Patient presents with:  Shoulder Pain: LOV 4-18-18 pt is here to f/u on R shoulder pain 6/10 .  Per pt R shoulder pain down to the elbow have bieng on and off, takes Tramadol 50mg as needed, had injections 4-18-18 95% [employer]:  \"repair (of bicep and tendon tear)       Family History   Family History   Problem Relation Age of Onset   • Diabetes Father    • Colon Cancer Father    • Diabetes Mother    • Heart Disease Mother    • Hypertension Mother    • Heart Disease Othe daily        Bike Helmet: Not Asked        Seat Belt: Not Asked        Self-Exams: Not Asked    Social History Narrative      Tamika Rasmussen is  x 31 yrs; Wisam [place]. Father of 2 adult daughters. He works as a san. Tamika Rasmussen, wife and children live in [place], [place]. Right: 4/5  ADM Right: 4/5  EIP Right: 4/5   Reflexes: 2+ In the bilateral upper extremities except:  Right biceps where it is absent   Gaming's sign Right: Negative   Gaming's sigh Left: Negative     Shoulder: The shoulders are stable.     Medial Border right shoulder         Plan  I will do a right shoulder injection under ultrasound guidance once I have insurance approval.    He will get a new right shoulder x-ray. He will get a MRI of the cervical spine.     He will call me once he has had the imagin

## 2018-12-03 NOTE — PATIENT INSTRUCTIONS
Plan  I will do a right shoulder injection under ultrasound guidance once I have insurance approval.    He will get a new right shoulder x-ray. He will get a MRI of the cervical spine.     He will call me once he has had the imaging studies and I will

## 2018-12-04 NOTE — TELEPHONE ENCOUNTER
[employer] for authorization of approval for MRI C-spine wo. Approval was given with   Authorization # [id number] effective 12/03/18 to 01/01/19. MRI is scheduled on 12/15/18. Johnson More

## 2018-12-04 NOTE — TELEPHONE ENCOUNTER
[address] [place] [address] for authorization of approval for Right shoulder injection cpt codes 04824,91329,. Talked to Hakan SMITH who states no authorization is required, subject to medical review. Reference # [id number].  Will inform [facility] [employer]

## 2018-12-15 ENCOUNTER — HOSPITAL ENCOUNTER (OUTPATIENT)
Dept: MRI IMAGING | Facility: HOSPITAL | Age: 53
Discharge: HOME | End: 2018-12-15
Attending: PHYSICAL MEDICINE & REHABILITATION
Payer: COMMERCIAL

## 2018-12-15 ENCOUNTER — HOSPITAL ENCOUNTER (OUTPATIENT)
Dept: GENERAL RADIOLOGY | Facility: HOSPITAL | Age: 53
Discharge: HOME | End: 2018-12-15
Attending: PHYSICAL MEDICINE & REHABILITATION
Payer: COMMERCIAL

## 2018-12-15 DIAGNOSIS — M19.011 PRIMARY OSTEOARTHRITIS OF RIGHT SHOULDER: ICD-10-CM

## 2018-12-15 DIAGNOSIS — M75.111 INCOMPLETE TEAR OF RIGHT ROTATOR CUFF: ICD-10-CM

## 2018-12-15 DIAGNOSIS — M75.01 ADHESIVE CAPSULITIS OF RIGHT SHOULDER: ICD-10-CM

## 2018-12-15 DIAGNOSIS — M54.12 CERVICAL RADICULOPATHY: ICD-10-CM

## 2018-12-15 PROCEDURE — 73030 X-RAY EXAM OF SHOULDER: CPT | Performed by: PHYSICAL MEDICINE & REHABILITATION

## 2018-12-15 PROCEDURE — 72141 MRI NECK SPINE W/O DYE: CPT | Performed by: PHYSICAL MEDICINE & REHABILITATION

## 2019-01-06 ENCOUNTER — PATIENT MESSAGE (OUTPATIENT)
Dept: NEUROLOGY | Facility: CLINIC | Age: 54
End: 2019-01-06

## 2019-01-07 NOTE — TELEPHONE ENCOUNTER
From: Harpal Hummel  To: Hien Quinonez MD  Sent: 1/6/2019 4:32 PM CST  Subject: Other    Hi Dr. Niya Chow. Claire Wellington I am in urgent need of a cortisone shot. I can not rotate my right wrist! I know you do shots on Thursdays .  Would it be possible to get in as your

## 2019-01-09 ENCOUNTER — OFFICE VISIT (OUTPATIENT)
Dept: NEUROLOGY | Facility: CLINIC | Age: 54
End: 2019-01-09
Payer: COMMERCIAL

## 2019-01-09 VITALS
HEART RATE: 66 BPM | DIASTOLIC BLOOD PRESSURE: 82 MMHG | SYSTOLIC BLOOD PRESSURE: 126 MMHG | BODY MASS INDEX: 27.32 KG/M2 | WEIGHT: 170 LBS | HEIGHT: 66 IN | RESPIRATION RATE: 16 BRPM

## 2019-01-09 DIAGNOSIS — M50.20 CERVICAL HERNIATED DISC: ICD-10-CM

## 2019-01-09 DIAGNOSIS — M25.511 CHRONIC RIGHT SHOULDER PAIN: ICD-10-CM

## 2019-01-09 DIAGNOSIS — G89.29 CHRONIC RIGHT SHOULDER PAIN: ICD-10-CM

## 2019-01-09 DIAGNOSIS — M75.111 INCOMPLETE TEAR OF RIGHT ROTATOR CUFF: ICD-10-CM

## 2019-01-09 DIAGNOSIS — G56.21 ULNAR NEUROPATHY OF RIGHT UPPER EXTREMITY: ICD-10-CM

## 2019-01-09 DIAGNOSIS — M75.01 ADHESIVE CAPSULITIS OF RIGHT SHOULDER: ICD-10-CM

## 2019-01-09 DIAGNOSIS — M48.02 CERVICAL STENOSIS OF SPINE: ICD-10-CM

## 2019-01-09 DIAGNOSIS — M50.90 CERVICAL DISC DISEASE: ICD-10-CM

## 2019-01-09 DIAGNOSIS — M75.21 BICEPS TENDONITIS ON RIGHT: ICD-10-CM

## 2019-01-09 DIAGNOSIS — M19.011 PRIMARY OSTEOARTHRITIS OF RIGHT SHOULDER: Primary | ICD-10-CM

## 2019-01-09 DIAGNOSIS — M54.12 CERVICAL RADICULOPATHY: ICD-10-CM

## 2019-01-09 DIAGNOSIS — M25.531 RIGHT WRIST PAIN: ICD-10-CM

## 2019-01-09 PROCEDURE — 99214 OFFICE O/P EST MOD 30 MIN: CPT | Performed by: PHYSICAL MEDICINE & REHABILITATION

## 2019-01-09 PROCEDURE — 20611 DRAIN/INJ JOINT/BURSA W/US: CPT | Performed by: PHYSICAL MEDICINE & REHABILITATION

## 2019-01-09 RX ORDER — TRIAMCINOLONE ACETONIDE 40 MG/ML
60 INJECTION, SUSPENSION INTRA-ARTICULAR; INTRAMUSCULAR ONCE
Status: COMPLETED | OUTPATIENT
Start: 2019-01-09 | End: 2019-01-09

## 2019-01-09 RX ORDER — LIDOCAINE HYDROCHLORIDE 10 MG/ML
2.5 INJECTION, SOLUTION INFILTRATION; PERINEURAL ONCE
Status: COMPLETED | OUTPATIENT
Start: 2019-01-09 | End: 2019-01-09

## 2019-01-18 ENCOUNTER — MED REC SCAN ONLY (OUTPATIENT)
Dept: NEUROLOGY | Facility: CLINIC | Age: 54
End: 2019-01-18

## 2019-01-26 PROBLEM — M50.20 CERVICAL HERNIATED DISC: Status: ACTIVE | Noted: 2019-01-26

## 2019-01-26 PROBLEM — M25.531 RIGHT WRIST PAIN: Status: ACTIVE | Noted: 2019-01-26

## 2019-01-26 PROBLEM — M50.90 CERVICAL DISC DISEASE: Status: ACTIVE | Noted: 2019-01-26

## 2019-01-26 PROBLEM — M48.02 CERVICAL STENOSIS OF SPINE: Status: ACTIVE | Noted: 2019-01-26

## 2019-01-26 NOTE — PROGRESS NOTES
Cervical Pain H & P    Chief Complaint:  Patient presents with: Injection: Patient presents for Right shoulder injection under ultrasound guidance. Rated pain a 6-7/10. Patient was last seen on 12/3/18.   He has had the MRI of the cervical spine and • Bleeding Disorders Neg    • Anemia Neg    • Clotting Disorder Neg        Social History   Social History    Socioeconomic History      Marital status:       Spouse name: Not on file      Number of children: Not on file      Years of education: N is well groomed. Psychiatric:  The patient is alert and oriented x 3. The patient has a normal affect and mood. Respiratory:  No acute respiratory distress. Patient does not have a cough. HEENT:  Extraocular muscles are intact.  There is no kern bulging discs    8. Adhesive capsulitis of right shoulder    9. Biceps tendonitis on right    10. right C6 and C8 radiculopathies    11.  Ulnar neuropathy of right upper extremity      Plan  I did a right shoulder steroid injection in the office today under

## 2020-01-06 ENCOUNTER — NURSE TRIAGE (OUTPATIENT)
Dept: FAMILY MEDICINE CLINIC | Facility: CLINIC | Age: 55
End: 2020-01-06

## 2020-01-06 ENCOUNTER — LAB ENCOUNTER (OUTPATIENT)
Dept: LAB | Age: 55
End: 2020-01-06
Attending: FAMILY MEDICINE
Payer: COMMERCIAL

## 2020-01-06 ENCOUNTER — OFFICE VISIT (OUTPATIENT)
Dept: FAMILY MEDICINE CLINIC | Facility: CLINIC | Age: 55
End: 2020-01-06
Payer: COMMERCIAL

## 2020-01-06 VITALS
HEART RATE: 98 BPM | DIASTOLIC BLOOD PRESSURE: 94 MMHG | SYSTOLIC BLOOD PRESSURE: 135 MMHG | WEIGHT: 179.19 LBS | TEMPERATURE: 98 F | HEIGHT: 66 IN | BODY MASS INDEX: 28.8 KG/M2

## 2020-01-06 DIAGNOSIS — H92.01 RIGHT EAR PAIN: ICD-10-CM

## 2020-01-06 DIAGNOSIS — H81.11 BPPV (BENIGN PAROXYSMAL POSITIONAL VERTIGO), RIGHT: ICD-10-CM

## 2020-01-06 DIAGNOSIS — R42 DIZZINESS: ICD-10-CM

## 2020-01-06 DIAGNOSIS — R42 DIZZINESS: Primary | ICD-10-CM

## 2020-01-06 LAB
ANION GAP SERPL CALC-SCNC: 6 MMOL/L (ref 0–18)
BASOPHILS # BLD AUTO: 0.03 X10(3) UL (ref 0–0.2)
BASOPHILS NFR BLD AUTO: 0.3 %
BUN BLD-MCNC: 21 MG/DL (ref 7–18)
BUN/CREAT SERPL: 18.4 (ref 10–20)
CALCIUM BLD-MCNC: 9.3 MG/DL (ref 8.5–10.1)
CHLORIDE SERPL-SCNC: 105 MMOL/L (ref 98–112)
CO2 SERPL-SCNC: 29 MMOL/L (ref 21–32)
CREAT BLD-MCNC: 1.14 MG/DL (ref 0.7–1.3)
DEPRECATED RDW RBC AUTO: 42.5 FL (ref 35.1–46.3)
EOSINOPHIL # BLD AUTO: 0.02 X10(3) UL (ref 0–0.7)
EOSINOPHIL NFR BLD AUTO: 0.2 %
ERYTHROCYTE [DISTWIDTH] IN BLOOD BY AUTOMATED COUNT: 12.4 % (ref 11–15)
GLUCOSE BLD-MCNC: 85 MG/DL (ref 70–99)
HCT VFR BLD AUTO: 46.4 % (ref 39–53)
HGB BLD-MCNC: 15.4 G/DL (ref 13–17.5)
IMM GRANULOCYTES # BLD AUTO: 0.02 X10(3) UL (ref 0–1)
IMM GRANULOCYTES NFR BLD: 0.2 %
LYMPHOCYTES # BLD AUTO: 1.95 X10(3) UL (ref 1–4)
LYMPHOCYTES NFR BLD AUTO: 17.4 %
MCH RBC QN AUTO: 31 PG (ref 26–34)
MCHC RBC AUTO-ENTMCNC: 33.2 G/DL (ref 31–37)
MCV RBC AUTO: 93.5 FL (ref 80–100)
MONOCYTES # BLD AUTO: 0.86 X10(3) UL (ref 0.1–1)
MONOCYTES NFR BLD AUTO: 7.7 %
NEUTROPHILS # BLD AUTO: 8.35 X10 (3) UL (ref 1.5–7.7)
NEUTROPHILS # BLD AUTO: 8.35 X10(3) UL (ref 1.5–7.7)
NEUTROPHILS NFR BLD AUTO: 74.2 %
OSMOLALITY SERPL CALC.SUM OF ELEC: 292 MOSM/KG (ref 275–295)
PATIENT FASTING Y/N/NP: NO
PLATELET # BLD AUTO: 354 10(3)UL (ref 150–450)
POTASSIUM SERPL-SCNC: 3.9 MMOL/L (ref 3.5–5.1)
RBC # BLD AUTO: 4.96 X10(6)UL (ref 4.3–5.7)
SODIUM SERPL-SCNC: 140 MMOL/L (ref 136–145)
WBC # BLD AUTO: 11.2 X10(3) UL (ref 4–11)

## 2020-01-06 PROCEDURE — 85025 COMPLETE CBC W/AUTO DIFF WBC: CPT

## 2020-01-06 PROCEDURE — 80048 BASIC METABOLIC PNL TOTAL CA: CPT

## 2020-01-06 PROCEDURE — 99214 OFFICE O/P EST MOD 30 MIN: CPT | Performed by: FAMILY MEDICINE

## 2020-01-06 PROCEDURE — 36415 COLL VENOUS BLD VENIPUNCTURE: CPT

## 2020-01-06 RX ORDER — PREDNISONE 20 MG/1
TABLET ORAL
Qty: 10 TABLET | Refills: 0 | Status: SHIPPED | OUTPATIENT
Start: 2020-01-06 | End: 2020-05-11

## 2020-01-06 RX ORDER — MECLIZINE HCL 12.5 MG/1
12.5 TABLET ORAL NIGHTLY
Qty: 10 TABLET | Refills: 0 | Status: SHIPPED | OUTPATIENT
Start: 2020-01-06 | End: 2020-01-16

## 2020-01-06 NOTE — PROGRESS NOTES
Patient ID: Pardeep Jackson is a 62year old male. HPI  Patient presents with:  Dizziness: x 1 month  Ear Pain: right ear    Pt has dizziness for 1 month. He feels the room is spinning similarly to when he is drunk and lays down in bed.  First felt diz hearing loss, tinnitus and voice change. Respiratory: Negative for chest tightness and shortness of breath. Cardiovascular: Negative for chest pain. Gastrointestinal: Positive for nausea. Negative for abdominal pain.    Musculoskeletal: Positive for Pulmonary/Chest: Effort normal and breath sounds normal. No respiratory distress. Neurological: Patient is alert and oriented to person, place, and time. Coordination and gait normal. Patient has normal strength and normal reflexes.  Patient displays no LUNCH)        Referrals (if applicable)  Orders Placed This Encounter      Physical Therapy ([facility]) - [place] Locations          Order Comments:              Treatment: Evaluate & Treat and use Modalities if needed               Physician goals: Pain relief

## 2020-01-06 NOTE — TELEPHONE ENCOUNTER
Pt scheduled appt through [employer] with following sx:    Appointment For: Mariana Sun ([id number])   Visit Type: Protective Systems EXAM (2964)      1/9/2020     2:30 PM  10 mins.  Roddy Meredith DO [facility]      Patient Comments:   Dizziness sinu

## 2020-01-06 NOTE — TELEPHONE ENCOUNTER
Action Requested: Summary for Provider     []  Critical Lab, Recommendations Needed  [] Need Additional Advice  []   FYI    []   Need Orders  [] Need Medications Sent to Pharmacy  []  Other     SUMMARY: Spoke with the patient who reports for one month he h

## 2020-07-08 ENCOUNTER — TELEPHONE (OUTPATIENT)
Dept: GASTROENTEROLOGY | Facility: CLINIC | Age: 55
End: 2020-07-08

## 2020-07-08 NOTE — TELEPHONE ENCOUNTER
----- Message from Dayna Dinero RN sent at 8/21/2015  7:55 AM CDT -----  Regarding: Colon recall  Recall for repeat colon in 5 years placed in epic

## 2020-08-07 ENCOUNTER — NURSE ONLY (OUTPATIENT)
Dept: GASTROENTEROLOGY | Facility: CLINIC | Age: 55
End: 2020-08-07

## 2020-08-07 ENCOUNTER — PATIENT MESSAGE (OUTPATIENT)
Dept: GASTROENTEROLOGY | Facility: CLINIC | Age: 55
End: 2020-08-07

## 2020-08-07 DIAGNOSIS — Z12.11 COLON CANCER SCREENING: Primary | ICD-10-CM

## 2020-08-07 NOTE — PROGRESS NOTES
Patient:   Jessy Rey #:   [id number]                    Room: [facility]  Missy CHURCH #:  [id number]     ADMITTED:   08/01/2015        OPERATIVE REPORT     DATE OF OPERATION:  08/01/15     PREOPERATIVE DIAGNOSIS:   Screening colonoscopy examination. 3:47 P  ATTENDING:  John Calvillo MD  DICTATING:    Andreia Damon. Eric Calvillo MD MD ID #:      [id number]  cc:   Andreia Damon.  Eric Calvillo, [address], [address]

## 2020-08-07 NOTE — PROGRESS NOTES
Dr. Cathrine Cranker-    Patient is due for CLN. I reviewed medications, allergies and pharmacy. Please advise on orders and prep. Patient would like you to know he is being treated for vertigo. Thank you.

## 2020-08-07 NOTE — PROGRESS NOTES
1. If pt has completed BOTH questionnaires entirely, review the questionnaires before calling patient. If any significant medical hx/pertinent positives/ GI sxs, then RN is to call pt and schedule OV visit with GI provider as “failed TCS.”  2.  Otherwise, p

## 2020-08-07 NOTE — PROGRESS NOTES
Thanks Miriam Dunn!!    Previous colonoscopy and recent outpatient notes reviewed.     GI schedulers –    Please schedule colonoscopy exam at [facility])/ [facility]    This patient IS appropriate for the [facility] endoscopy [facility]

## 2020-08-10 NOTE — PROGRESS NOTES
Scheduled for:  Colonoscopy - 09626  Provider Name:  Dr. Jerel Montemayor  Date:  10/7/20  Location:  [facility]  Sedation:  MAC  Time:  1:30 pm (pt is aware to arrive at 12:30 pm)  Prep:  Golytely, Prep instructions were given to pt over the phone, pt verbalized und

## 2020-08-10 NOTE — PROGRESS NOTES
GI Schedulers-    Please contact patient to schedule CLN per Dr Ann Marie Zapata below. Patient is no longer on Xarelto: Follow up questions     Mp Dumont  You 3 days ago         Hello.      I started Xarelto August of 2017 and went off the Medication o

## 2020-10-01 ENCOUNTER — TELEPHONE (OUTPATIENT)
Dept: GASTROENTEROLOGY | Facility: CLINIC | Age: 55
End: 2020-10-01

## 2020-10-01 DIAGNOSIS — Z12.11 COLON CANCER SCREENING: Primary | ICD-10-CM

## 2020-10-01 NOTE — TELEPHONE ENCOUNTER
Rescheduled for:  Colonoscopy 46062  Provider Name:  Dr. Juan A Freitas  Date:  10/7/20  [facility]  Sedation:  MAC  Time:  1330 (pt is aware that [facility] will call the day before to confirm arrival time)   Prep:  Golytely, sent new instructions

## 2020-10-04 ENCOUNTER — LAB REQUISITION (OUTPATIENT)
Dept: LAB | Facility: HOSPITAL | Age: 55
End: 2020-10-04
Payer: COMMERCIAL

## 2020-10-04 DIAGNOSIS — Z01.818 ENCOUNTER FOR OTHER PREPROCEDURAL EXAMINATION: ICD-10-CM

## 2020-10-07 ENCOUNTER — LAB REQUISITION (OUTPATIENT)
Dept: LAB | Facility: HOSPITAL | Age: 55
End: 2020-10-07
Payer: COMMERCIAL

## 2020-10-07 ENCOUNTER — SURGERY CENTER DOCUMENTATION (OUTPATIENT)
Dept: SURGERY | Age: 55
End: 2020-10-07

## 2020-10-07 DIAGNOSIS — Z12.11 ENCOUNTER FOR SCREENING FOR MALIGNANT NEOPLASM OF COLON: ICD-10-CM

## 2020-10-07 PROCEDURE — 88305 TISSUE EXAM BY PATHOLOGIST: CPT | Performed by: INTERNAL MEDICINE

## 2020-10-07 NOTE — PROCEDURES
[facility]      COLONOSCOPY PROCEDURE REPORT     DATE OF PROCEDURE:  10/7/2020     PCP: Blair Lockhart DO     PREOPERATIVE DIAGNOSIS:  Screening colonoscopy, family history colon cancer in a parent     POSTOPERATIVE DIAGNOSIS:  See polyps 6-8 mm removed from the proximal, mid transverse colon by cold snare polypectomy, suctioned out. · One 6 mm sessile polyp removed in the distal transverse colon by cold snare polypectomy, suctioned out.   · 2 sessile polyps about 6 mm each removed f

## 2020-10-18 ENCOUNTER — TELEPHONE (OUTPATIENT)
Dept: GASTROENTEROLOGY | Facility: CLINIC | Age: 55
End: 2020-10-18

## 2020-10-19 ENCOUNTER — TELEPHONE (OUTPATIENT)
Dept: GASTROENTEROLOGY | Facility: CLINIC | Age: 55
End: 2020-10-19

## 2020-10-19 NOTE — TELEPHONE ENCOUNTER
Results letter mail to patient per   Recall Colon in 1 yr. 10/7/2021  Colon done in 10/1/2020   Updated  Mundo Lowery MD  [facility] Clinical Staff             GI RNs - 1.  Please print and mail this letter to patient; 2. Recall for colo

## 2021-07-29 ENCOUNTER — OFFICE VISIT (OUTPATIENT)
Dept: FAMILY MEDICINE CLINIC | Facility: CLINIC | Age: 56
End: 2021-07-29
Payer: COMMERCIAL

## 2021-07-29 VITALS
TEMPERATURE: 98 F | DIASTOLIC BLOOD PRESSURE: 78 MMHG | WEIGHT: 164.19 LBS | HEART RATE: 98 BPM | SYSTOLIC BLOOD PRESSURE: 132 MMHG | HEIGHT: 66 IN | BODY MASS INDEX: 26.39 KG/M2

## 2021-07-29 DIAGNOSIS — M18.12 DEGENERATIVE ARTHRITIS OF THUMB, LEFT: ICD-10-CM

## 2021-07-29 DIAGNOSIS — M75.111 NONTRAUMATIC INCOMPLETE TEAR OF RIGHT ROTATOR CUFF: ICD-10-CM

## 2021-07-29 DIAGNOSIS — R22.32 LOCALIZED SWELLING OF LEFT THUMB: ICD-10-CM

## 2021-07-29 DIAGNOSIS — M19.011 PRIMARY OSTEOARTHRITIS OF RIGHT SHOULDER: Primary | ICD-10-CM

## 2021-07-29 PROCEDURE — 3078F DIAST BP <80 MM HG: CPT | Performed by: FAMILY MEDICINE

## 2021-07-29 PROCEDURE — 3008F BODY MASS INDEX DOCD: CPT | Performed by: FAMILY MEDICINE

## 2021-07-29 PROCEDURE — 3075F SYST BP GE 130 - 139MM HG: CPT | Performed by: FAMILY MEDICINE

## 2021-07-29 PROCEDURE — 99214 OFFICE O/P EST MOD 30 MIN: CPT | Performed by: FAMILY MEDICINE

## 2021-07-29 NOTE — PROGRESS NOTES
Patient ID: Pantera Norris is a 60year old male. HPI  Patient presents with:  Shoulder Pain: Right shoulder pain x 1 month    Last seen by me on 1/6/2020. Pt is a san. Pt c/o right shoulder pain.  Pt has pain in the right side of the [place] ========================================================================    Wt Readings from Last 6 Encounters:  07/29/21 : 164 lb 3.2 oz  01/06/20 : 179 lb 3.2 oz  01/09/19 : 170 lb  12/03/18 : 170 lb  08/28/18 : 172 lb  04/18/18 : 165 lb      BMI Durga Mims and time. Skin: Skin is warm. Psychiatry: Normal mood and affect. Left thumb: Swelling at ulnar side of IP joint. Left thumb is radially deviated.     SHOULDER EXAM: RIGHT   Range of Motion-Abduction 90 degrees    Forward Flexion 90 degrees   IR Severe Requested Specialty:SURGERY, PLASTIC          Number of Visits Requested:3      Follow up if symptoms persist.  Take medicine (if given) as prescribed. Approach to treatment discussed and patient/family member understands and agrees to plan.      No

## 2021-07-30 ENCOUNTER — TELEPHONE (OUTPATIENT)
Dept: FAMILY MEDICINE CLINIC | Facility: CLINIC | Age: 56
End: 2021-07-30

## 2021-07-30 DIAGNOSIS — M19.011 PRIMARY OSTEOARTHRITIS OF RIGHT SHOULDER: Primary | ICD-10-CM

## 2021-07-30 DIAGNOSIS — M75.111 NONTRAUMATIC INCOMPLETE TEAR OF RIGHT ROTATOR CUFF: ICD-10-CM

## 2021-07-31 ENCOUNTER — HOSPITAL ENCOUNTER (OUTPATIENT)
Dept: GENERAL RADIOLOGY | Facility: HOSPITAL | Age: 56
Discharge: HOME | End: 2021-07-31
Attending: FAMILY MEDICINE
Payer: COMMERCIAL

## 2021-07-31 DIAGNOSIS — M19.011 PRIMARY OSTEOARTHRITIS OF RIGHT SHOULDER: ICD-10-CM

## 2021-07-31 DIAGNOSIS — R22.32 LOCALIZED SWELLING OF LEFT THUMB: ICD-10-CM

## 2021-07-31 DIAGNOSIS — M18.12 DEGENERATIVE ARTHRITIS OF THUMB, LEFT: ICD-10-CM

## 2021-07-31 PROCEDURE — 73030 X-RAY EXAM OF SHOULDER: CPT | Performed by: FAMILY MEDICINE

## 2021-07-31 PROCEDURE — 73140 X-RAY EXAM OF FINGER(S): CPT | Performed by: FAMILY MEDICINE

## 2021-08-11 ENCOUNTER — TELEPHONE (OUTPATIENT)
Dept: GASTROENTEROLOGY | Facility: CLINIC | Age: 56
End: 2021-08-11

## 2021-08-11 NOTE — TELEPHONE ENCOUNTER
Patient outreach message received. Recall reminder letter mailed out to patient. \"Recall Colon in 1 yr. 10/7/2021  Colon done in 10/1/2020\"

## 2021-08-17 ENCOUNTER — TELEPHONE (OUTPATIENT)
Dept: GASTROENTEROLOGY | Facility: CLINIC | Age: 56
End: 2021-08-17

## 2021-08-17 DIAGNOSIS — Z86.010 PERSONAL HISTORY OF COLONIC POLYPS: Primary | ICD-10-CM

## 2021-08-18 NOTE — TELEPHONE ENCOUNTER
Last Procedure, Date, MD:  10/07/2020, Colonoscopy, Dr Henry Pagdett  Last Diagnosis: tubular adenomas, , sessile polyps   Recalled for (mth/yrs): 1 year  Sedation used previously:  MAC  Last Prep Used (if known):  Golytely  Quality of prep (if known): good  Anti

## 2021-08-18 NOTE — TELEPHONE ENCOUNTER
[facility]        COLONOSCOPY PROCEDURE REPORT     DATE OF PROCEDURE:  10/7/2020      PCP: Shwetha Griffith DO     PREOPERATIVE DIAGNOSIS:  Screening colonoscopy, family history colon cancer in a parent     POSTOPERATIVE DIAGNOSIS:  S sessile polyps 6-8 mm removed from the proximal, mid transverse colon by cold snare polypectomy, suctioned out. · One 6 mm sessile polyp removed in the distal transverse colon by cold snare polypectomy, suctioned out.   · 2 sessile polyps about 6 mm each r

## 2021-08-19 RX ORDER — POLYETHYLENE GLYCOL 3350, SODIUM CHLORIDE, SODIUM BICARBONATE, POTASSIUM CHLORIDE 420; 11.2; 5.72; 1.48 G/4L; G/4L; G/4L; G/4L
POWDER, FOR SOLUTION ORAL
Qty: 4000 ML | Refills: 0 | Status: SHIPPED | OUTPATIENT
Start: 2021-08-19

## 2021-08-19 NOTE — TELEPHONE ENCOUNTER
Thanks all. Office visits with Dr Елена Avila 7/29/2021 and 1/6/2020 reviewed.     GI schedulers –    Please schedule colonoscopy exam at [facility] ([facility])    BMI Readings from Last 1 Encounters:  07/29/21 : 26.50 kg/m²     MAC ane

## 2021-08-26 ENCOUNTER — OFFICE VISIT (OUTPATIENT)
Dept: SURGERY | Facility: CLINIC | Age: 56
End: 2021-08-26
Payer: COMMERCIAL

## 2021-08-26 DIAGNOSIS — M19.042 PRIMARY OSTEOARTHRITIS OF LEFT HAND: Primary | ICD-10-CM

## 2021-08-26 PROCEDURE — 99243 OFF/OP CNSLTJ NEW/EST LOW 30: CPT | Performed by: PLASTIC SURGERY

## 2021-08-26 NOTE — H&P
Pearl Pryor is a 60year old male that presents with Patient presents with:  Pain: LTH       REFERRED BY:  Gina May    Pacemaker: No  Latex Allergy: no  Coumadin: No  Plavix: No  Other anticoagulants: No  Cardiac stents: No    HAND DOMINANCE:  Ri Dispense Refill   • PEG 3350-KCl-Na Bicarb-NaCl 420 g Oral Recon Soln Take as directed per instructions from Dr. Triny Denisen office.  (Patient not taking: Reported on 8/26/2021 ) 4000 mL 0        SOCIAL HISTORY  Social History    Tobacco Use      Smoking statu Arthritis left thumb IP with deformity  Bilateral diffuse hand    Nothing need be done for the IP deformity unless he develops severe pain or functional problems  A fusion would then be indicated    We discussed what arthritis is, including treatment o

## 2021-08-27 ENCOUNTER — MED REC SCAN ONLY (OUTPATIENT)
Dept: NEUROLOGY | Facility: CLINIC | Age: 56
End: 2021-08-27

## 2021-08-27 NOTE — TELEPHONE ENCOUNTER
Scheduled for:  Colonoscopy - 21808  Provider Name:  Dr. Nicola Bal  Date:  12/14/21  Location:  [facility]  Sedation:  MAC  Time:  8:30 am (pt is aware to arrive at 7:30 am)  Prep:  Guille Rubi instructions were given to pt's wife over the phone, pt verbalized un

## 2021-08-27 NOTE — TELEPHONE ENCOUNTER
I called patient to schedule procedure, he says he was at work advise I call his wife (piper) listed as emergency contact to schedule procedure because she schedules all his appointments for him. i called patients wife at his request and got no answer, I le

## 2021-08-30 ENCOUNTER — APPOINTMENT (OUTPATIENT)
Dept: SURGERY | Facility: CLINIC | Age: 56
End: 2021-08-30
Payer: COMMERCIAL

## 2021-08-30 DIAGNOSIS — M79.602 PAIN IN BOTH UPPER EXTREMITIES: Primary | ICD-10-CM

## 2021-08-30 DIAGNOSIS — M79.601 PAIN IN BOTH UPPER EXTREMITIES: Primary | ICD-10-CM

## 2021-08-30 PROCEDURE — 97166 OT EVAL MOD COMPLEX 45 MIN: CPT | Performed by: OCCUPATIONAL THERAPIST

## 2021-08-30 PROCEDURE — 97018 PARAFFIN BATH THERAPY: CPT | Performed by: OCCUPATIONAL THERAPIST

## 2021-08-30 PROCEDURE — 97110 THERAPEUTIC EXERCISES: CPT | Performed by: OCCUPATIONAL THERAPIST

## 2021-08-31 NOTE — PROGRESS NOTES
OCCUPATIONAL THERAPY EVALUATION:   Brandt Schafer   [id number]       SUBJECTIVE:    HX of Injury: Bilateral hand pain, secondary to arthritis. Chief Complaint:   Limited motion of bilateral hands, with discomfort. .    Precautions: None  Premorbid Functi use of a home paraffin unit. Patient will be seen 1 x /week for 1 weeks or a total of 1 visits. Pt. was advised regarding the findings of this evaluation and agrees to the plan of care.      ( No further skilled OT services are indicated at this time

## 2021-12-01 ENCOUNTER — IMMUNIZATION (OUTPATIENT)
Dept: LAB | Facility: HOSPITAL | Age: 56
End: 2021-12-01
Attending: EMERGENCY MEDICINE
Payer: COMMERCIAL

## 2021-12-01 DIAGNOSIS — Z23 NEED FOR VACCINATION: Primary | ICD-10-CM

## 2021-12-01 PROCEDURE — 0064A SARSCOV2 VAC 50MCG/0.25ML IM: CPT

## 2021-12-07 NOTE — TELEPHONE ENCOUNTER
Hello, Just an FYI, PA not required but screening CLN only covered every 5 years. See referral note.

## 2021-12-11 ENCOUNTER — LAB ENCOUNTER (OUTPATIENT)
Dept: LAB | Facility: HOSPITAL | Age: 56
End: 2021-12-11
Attending: INTERNAL MEDICINE
Payer: COMMERCIAL

## 2021-12-11 DIAGNOSIS — Z01.818 PRE-OP TESTING: ICD-10-CM

## 2021-12-14 ENCOUNTER — HOSPITAL ENCOUNTER (OUTPATIENT)
Facility: HOSPITAL | Age: 56
Setting detail: HOSPITAL OUTPATIENT SURGERY
Discharge: HOME | End: 2021-12-14
Attending: INTERNAL MEDICINE | Admitting: INTERNAL MEDICINE
Payer: COMMERCIAL

## 2021-12-14 ENCOUNTER — ANESTHESIA EVENT (OUTPATIENT)
Dept: ENDOSCOPY | Facility: HOSPITAL | Age: 56
End: 2021-12-14
Payer: COMMERCIAL

## 2021-12-14 ENCOUNTER — ANESTHESIA (OUTPATIENT)
Dept: ENDOSCOPY | Facility: HOSPITAL | Age: 56
End: 2021-12-14
Payer: COMMERCIAL

## 2021-12-14 VITALS
RESPIRATION RATE: 13 BRPM | TEMPERATURE: 98 F | OXYGEN SATURATION: 96 % | HEART RATE: 63 BPM | WEIGHT: 170 LBS | HEIGHT: 66 IN | DIASTOLIC BLOOD PRESSURE: 85 MMHG | BODY MASS INDEX: 27.32 KG/M2 | SYSTOLIC BLOOD PRESSURE: 112 MMHG

## 2021-12-14 DIAGNOSIS — Z01.818 PRE-OP TESTING: Primary | ICD-10-CM

## 2021-12-14 DIAGNOSIS — Z86.010 PERSONAL HISTORY OF COLONIC POLYPS: ICD-10-CM

## 2021-12-14 PROCEDURE — 45385 COLONOSCOPY W/LESION REMOVAL: CPT | Performed by: INTERNAL MEDICINE

## 2021-12-14 PROCEDURE — 0DBL8ZX EXCISION OF TRANSVERSE COLON, VIA NATURAL OR ARTIFICIAL OPENING ENDOSCOPIC, DIAGNOSTIC: ICD-10-PCS | Performed by: INTERNAL MEDICINE

## 2021-12-14 RX ORDER — NALOXONE HYDROCHLORIDE 0.4 MG/ML
80 INJECTION, SOLUTION INTRAMUSCULAR; INTRAVENOUS; SUBCUTANEOUS AS NEEDED
Status: DISCONTINUED | OUTPATIENT
Start: 2021-12-14 | End: 2021-12-14

## 2021-12-14 RX ORDER — SODIUM CHLORIDE, SODIUM LACTATE, POTASSIUM CHLORIDE, CALCIUM CHLORIDE 600; 310; 30; 20 MG/100ML; MG/100ML; MG/100ML; MG/100ML
INJECTION, SOLUTION INTRAVENOUS CONTINUOUS
Status: DISCONTINUED | OUTPATIENT
Start: 2021-12-14 | End: 2021-12-14

## 2021-12-14 RX ORDER — LIDOCAINE HYDROCHLORIDE 10 MG/ML
INJECTION, SOLUTION EPIDURAL; INFILTRATION; INTRACAUDAL; PERINEURAL AS NEEDED
Status: DISCONTINUED | OUTPATIENT
Start: 2021-12-14 | End: 2021-12-14 | Stop reason: SURG

## 2021-12-14 RX ADMIN — SODIUM CHLORIDE, SODIUM LACTATE, POTASSIUM CHLORIDE, CALCIUM CHLORIDE: 600; 310; 30; 20 INJECTION, SOLUTION INTRAVENOUS at 08:59:00

## 2021-12-14 RX ADMIN — SODIUM CHLORIDE, SODIUM LACTATE, POTASSIUM CHLORIDE, CALCIUM CHLORIDE: 600; 310; 30; 20 INJECTION, SOLUTION INTRAVENOUS at 09:30:00

## 2021-12-14 RX ADMIN — LIDOCAINE HYDROCHLORIDE 25 MG: 10 INJECTION, SOLUTION EPIDURAL; INFILTRATION; INTRACAUDAL; PERINEURAL at 09:02:00

## 2021-12-14 NOTE — ANESTHESIA PREPROCEDURE EVALUATION
Anesthesia PreOp Note    HPI:     Trang Stover is a 60year old male who presents for preoperative consultation requested by: Kerrie Marrero MD    Date of Surgery: 12/14/2021    Procedure(s):  COLONOSCOPY  Indication: Personal history of col Date Noted: 03/27/2017      H/O total hip arthroplasty, right         Date Noted: 05/13/2016      right hip severe OA and s/p GEORGE         Date Noted: 02/06/2016      Right hip pain         Date Noted: 01/26/2016      Right low back pain         Date Noted: • Diabetes Other         Family h/o Diabetes   • Seizure Disorder Daughter    • Alcohol and Other Disorders Associated Brother         Alcoholism   • Other (Other) Brother         Drug abuse   • Asthma Other         Nephew   • Bleeding Disorders Neg    • Asked    Social History Narrative      Delfina Sidhu is  x 31 yrs; Zaina Cordova. Father of 2 adult daughters. He works as a san. Delfina Sidhu, wife and children live in [place], [place]. Patient plays golf regularly.              The patient does not use an assistive device best of my ability. The patient desires the anesthetic management as planned.   Roula Valentin MD  12/14/2021 8:27 AM

## 2021-12-14 NOTE — ANESTHESIA POSTPROCEDURE EVALUATION
Patient: Trang Stover    Procedure Summary     Date: 12/14/21 Room / Location: [facility] ENDOSCOPY 05 / [facility] ENDOSCOPY    Anesthesia Start: 9950 Anesthesia Stop: 9903    Procedure: COLONOSCOPY (N/A ) Diagnosis:       Personal history of colonic polyps      (P

## 2021-12-14 NOTE — OPERATIVE REPORT
COLONOSCOPY PROCEDURE REPORT     DATE OF PROCEDURE:  12/14/2021     PCP: Alondra Marsh DO     PREOPERATIVE DIAGNOSIS:  history high risk adenomatous colon polyps     POSTOPERATIVE DIAGNOSIS:  See impression. SURGEON:  LATHA Funez Asa

## 2021-12-14 NOTE — H&P
History & Physical Examination    Patient Name: Yvonne Borja  MRN: [id number]  [facility]: [id number]  YOB: 1965    Diagnosis: history high risk adenomatous colon polyps    Present Illness: 13-year-old gentleman with history of DVT, smoking, mult quit chewing tobacco in 2010; former 20 yrs of use    Alcohol use:  Yes      Alcohol/week: 0.0 standard drinks      Comment: socially      SYSTEM Check if Review is Normal Check if Physical Exam is Normal If not normal, please explain:   ANDREEA [ ] Nena HAIDER

## 2021-12-29 ENCOUNTER — TELEPHONE (OUTPATIENT)
Dept: GASTROENTEROLOGY | Facility: CLINIC | Age: 56
End: 2021-12-29

## 2021-12-29 NOTE — TELEPHONE ENCOUNTER
Roderick Call MD  [facility] Clinical Staff  GI RNs - 1. Please print and mail this letter to patient; 2. Recall for colonoscopy exam in 3 years      Results letter sent to patient via [employer] and also printed and mailed out to patient.      Siddharth

## 2022-06-05 ENCOUNTER — IMMUNIZATION (OUTPATIENT)
Dept: LAB | Age: 57
End: 2022-06-05
Attending: EMERGENCY MEDICINE
Payer: COMMERCIAL

## 2022-06-05 DIAGNOSIS — Z23 NEED FOR VACCINATION: Primary | ICD-10-CM

## 2022-06-05 PROCEDURE — 0064A SARSCOV2 VAC 50MCG/0.25ML IM: CPT

## 2022-11-06 ENCOUNTER — IMMUNIZATION (OUTPATIENT)
Dept: LAB | Age: 57
End: 2022-11-06
Attending: EMERGENCY MEDICINE
Payer: COMMERCIAL

## 2022-11-06 DIAGNOSIS — Z23 NEED FOR VACCINATION: Primary | ICD-10-CM

## 2022-11-06 PROCEDURE — 0134A SARSCOV2 VAC BVL 50MCG/0.5ML: CPT

## 2023-03-28 ENCOUNTER — OFFICE VISIT (OUTPATIENT)
Dept: FAMILY MEDICINE CLINIC | Facility: CLINIC | Age: 58
End: 2023-03-28

## 2023-03-28 VITALS
HEIGHT: 66 IN | WEIGHT: 175 LBS | SYSTOLIC BLOOD PRESSURE: 132 MMHG | TEMPERATURE: 97 F | DIASTOLIC BLOOD PRESSURE: 85 MMHG | HEART RATE: 85 BPM | BODY MASS INDEX: 28.13 KG/M2

## 2023-03-28 DIAGNOSIS — Z23 NEED FOR VACCINATION: ICD-10-CM

## 2023-03-28 DIAGNOSIS — M25.611 DECREASED RANGE OF MOTION OF RIGHT SHOULDER: ICD-10-CM

## 2023-03-28 DIAGNOSIS — M25.511 CHRONIC PAIN IN RIGHT SHOULDER: ICD-10-CM

## 2023-03-28 DIAGNOSIS — M19.011 ARTHRITIS OF RIGHT SHOULDER REGION: ICD-10-CM

## 2023-03-28 DIAGNOSIS — Z00.00 ADULT GENERAL MEDICAL EXAM: Primary | ICD-10-CM

## 2023-03-28 DIAGNOSIS — G89.29 CHRONIC PAIN IN RIGHT SHOULDER: ICD-10-CM

## 2023-03-28 DIAGNOSIS — R07.81 RIB PAIN ON RIGHT SIDE: ICD-10-CM

## 2023-03-28 PROCEDURE — 3075F SYST BP GE 130 - 139MM HG: CPT | Performed by: FAMILY MEDICINE

## 2023-03-28 PROCEDURE — 3079F DIAST BP 80-89 MM HG: CPT | Performed by: FAMILY MEDICINE

## 2023-03-28 PROCEDURE — 3008F BODY MASS INDEX DOCD: CPT | Performed by: FAMILY MEDICINE

## 2023-03-28 PROCEDURE — 90750 HZV VACC RECOMBINANT IM: CPT | Performed by: FAMILY MEDICINE

## 2023-03-28 PROCEDURE — 99396 PREV VISIT EST AGE 40-64: CPT | Performed by: FAMILY MEDICINE

## 2023-03-28 PROCEDURE — 90471 IMMUNIZATION ADMIN: CPT | Performed by: FAMILY MEDICINE

## 2023-03-28 PROCEDURE — 99212 OFFICE O/P EST SF 10 MIN: CPT | Performed by: FAMILY MEDICINE

## 2023-04-08 ENCOUNTER — HOSPITAL ENCOUNTER (OUTPATIENT)
Dept: GENERAL RADIOLOGY | Facility: HOSPITAL | Age: 58
Discharge: HOME | End: 2023-04-08
Attending: FAMILY MEDICINE
Payer: COMMERCIAL

## 2023-04-08 ENCOUNTER — LAB ENCOUNTER (OUTPATIENT)
Dept: LAB | Facility: HOSPITAL | Age: 58
End: 2023-04-08
Attending: FAMILY MEDICINE
Payer: COMMERCIAL

## 2023-04-08 DIAGNOSIS — G89.29 CHRONIC PAIN IN RIGHT SHOULDER: ICD-10-CM

## 2023-04-08 DIAGNOSIS — M25.611 DECREASED RANGE OF MOTION OF RIGHT SHOULDER: ICD-10-CM

## 2023-04-08 DIAGNOSIS — M19.011 ARTHRITIS OF RIGHT SHOULDER REGION: ICD-10-CM

## 2023-04-08 DIAGNOSIS — Z00.00 ADULT GENERAL MEDICAL EXAM: ICD-10-CM

## 2023-04-08 DIAGNOSIS — M25.511 CHRONIC PAIN IN RIGHT SHOULDER: ICD-10-CM

## 2023-04-08 LAB
ALBUMIN SERPL-MCNC: 3.5 G/DL (ref 3.4–5)
ALBUMIN/GLOB SERPL: 0.9 {RATIO} (ref 1–2)
ALP LIVER SERPL-CCNC: 102 U/L
ALT SERPL-CCNC: 39 U/L
ANION GAP SERPL CALC-SCNC: 4 MMOL/L (ref 0–18)
AST SERPL-CCNC: 31 U/L (ref 15–37)
BASOPHILS # BLD AUTO: 0.05 X10(3) UL (ref 0–0.2)
BASOPHILS NFR BLD AUTO: 0.7 %
BILIRUB SERPL-MCNC: 0.5 MG/DL (ref 0.1–2)
BUN BLD-MCNC: 16 MG/DL (ref 7–18)
BUN/CREAT SERPL: 16.5 (ref 10–20)
CALCIUM BLD-MCNC: 8.8 MG/DL (ref 8.5–10.1)
CHLORIDE SERPL-SCNC: 110 MMOL/L (ref 98–112)
CHOLEST SERPL-MCNC: 204 MG/DL (ref ?–200)
CO2 SERPL-SCNC: 27 MMOL/L (ref 21–32)
COMPLEXED PSA SERPL-MCNC: 2.04 NG/ML (ref ?–4)
CREAT BLD-MCNC: 0.97 MG/DL
DEPRECATED RDW RBC AUTO: 42.1 FL (ref 35.1–46.3)
EOSINOPHIL # BLD AUTO: 0.21 X10(3) UL (ref 0–0.7)
EOSINOPHIL NFR BLD AUTO: 2.9 %
ERYTHROCYTE [DISTWIDTH] IN BLOOD BY AUTOMATED COUNT: 12.3 % (ref 11–15)
FASTING PATIENT LIPID ANSWER: YES
FASTING STATUS PATIENT QL REPORTED: YES
GFR SERPLBLD BASED ON 1.73 SQ M-ARVRAT: 90 ML/MIN/1.73M2 (ref 60–?)
GLOBULIN PLAS-MCNC: 3.7 G/DL (ref 2.8–4.4)
GLUCOSE BLD-MCNC: 99 MG/DL (ref 70–99)
HCT VFR BLD AUTO: 46 %
HDLC SERPL-MCNC: 80 MG/DL (ref 40–59)
HGB BLD-MCNC: 14.9 G/DL
IMM GRANULOCYTES # BLD AUTO: 0.03 X10(3) UL (ref 0–1)
IMM GRANULOCYTES NFR BLD: 0.4 %
LDLC SERPL CALC-MCNC: 114 MG/DL (ref ?–100)
LYMPHOCYTES # BLD AUTO: 1.68 X10(3) UL (ref 1–4)
LYMPHOCYTES NFR BLD AUTO: 23 %
MCH RBC QN AUTO: 30.1 PG (ref 26–34)
MCHC RBC AUTO-ENTMCNC: 32.4 G/DL (ref 31–37)
MCV RBC AUTO: 92.9 FL
MONOCYTES # BLD AUTO: 0.69 X10(3) UL (ref 0.1–1)
MONOCYTES NFR BLD AUTO: 9.4 %
NEUTROPHILS # BLD AUTO: 4.66 X10 (3) UL (ref 1.5–7.7)
NEUTROPHILS # BLD AUTO: 4.66 X10(3) UL (ref 1.5–7.7)
NEUTROPHILS NFR BLD AUTO: 63.6 %
NONHDLC SERPL-MCNC: 124 MG/DL (ref ?–130)
OSMOLALITY SERPL CALC.SUM OF ELEC: 293 MOSM/KG (ref 275–295)
PLATELET # BLD AUTO: 338 10(3)UL (ref 150–450)
POTASSIUM SERPL-SCNC: 4.2 MMOL/L (ref 3.5–5.1)
PROT SERPL-MCNC: 7.2 G/DL (ref 6.4–8.2)
RBC # BLD AUTO: 4.95 X10(6)UL
SODIUM SERPL-SCNC: 141 MMOL/L (ref 136–145)
TRIGL SERPL-MCNC: 56 MG/DL (ref 30–149)
TSI SER-ACNC: 2.48 MIU/ML (ref 0.36–3.74)
VLDLC SERPL CALC-MCNC: 10 MG/DL (ref 0–30)
WBC # BLD AUTO: 7.3 X10(3) UL (ref 4–11)

## 2023-04-08 PROCEDURE — 85025 COMPLETE CBC W/AUTO DIFF WBC: CPT

## 2023-04-08 PROCEDURE — 36415 COLL VENOUS BLD VENIPUNCTURE: CPT

## 2023-04-08 PROCEDURE — 80061 LIPID PANEL: CPT

## 2023-04-08 PROCEDURE — 73030 X-RAY EXAM OF SHOULDER: CPT | Performed by: FAMILY MEDICINE

## 2023-04-08 PROCEDURE — 80053 COMPREHEN METABOLIC PANEL: CPT

## 2023-04-08 PROCEDURE — 84443 ASSAY THYROID STIM HORMONE: CPT

## 2023-05-30 ENCOUNTER — NURSE ONLY (OUTPATIENT)
Dept: FAMILY MEDICINE CLINIC | Facility: CLINIC | Age: 58
End: 2023-05-30

## 2023-05-30 DIAGNOSIS — Z23 NEED FOR VACCINATION: Primary | ICD-10-CM

## 2023-05-30 PROCEDURE — 90471 IMMUNIZATION ADMIN: CPT | Performed by: FAMILY MEDICINE

## 2023-05-30 PROCEDURE — 90750 HZV VACC RECOMBINANT IM: CPT | Performed by: FAMILY MEDICINE

## 2023-10-09 ENCOUNTER — OFFICE VISIT (OUTPATIENT)
Dept: FAMILY MEDICINE CLINIC | Facility: CLINIC | Age: 58
End: 2023-10-09

## 2023-10-09 VITALS
SYSTOLIC BLOOD PRESSURE: 137 MMHG | HEIGHT: 66 IN | HEART RATE: 96 BPM | BODY MASS INDEX: 27.64 KG/M2 | DIASTOLIC BLOOD PRESSURE: 90 MMHG | TEMPERATURE: 98 F | WEIGHT: 172 LBS

## 2023-10-09 DIAGNOSIS — M25.552 CHRONIC LEFT HIP PAIN: Primary | ICD-10-CM

## 2023-10-09 DIAGNOSIS — S76.012A STRAIN OF HIP FLEXOR, LEFT, INITIAL ENCOUNTER: ICD-10-CM

## 2023-10-09 DIAGNOSIS — Z96.641 HISTORY OF TOTAL HIP ARTHROPLASTY, RIGHT: ICD-10-CM

## 2023-10-09 DIAGNOSIS — M79.661 TENDERNESS OF RIGHT CALF: ICD-10-CM

## 2023-10-09 DIAGNOSIS — G89.29 CHRONIC LEFT HIP PAIN: Primary | ICD-10-CM

## 2023-10-09 DIAGNOSIS — Z86.718 HISTORY OF DVT OF LOWER EXTREMITY: ICD-10-CM

## 2023-10-09 DIAGNOSIS — R03.0 ELEVATED BLOOD PRESSURE READING: ICD-10-CM

## 2023-10-09 PROCEDURE — 99214 OFFICE O/P EST MOD 30 MIN: CPT | Performed by: FAMILY MEDICINE

## 2023-10-09 PROCEDURE — 3075F SYST BP GE 130 - 139MM HG: CPT | Performed by: FAMILY MEDICINE

## 2023-10-09 PROCEDURE — 3008F BODY MASS INDEX DOCD: CPT | Performed by: FAMILY MEDICINE

## 2023-10-09 PROCEDURE — 3080F DIAST BP >= 90 MM HG: CPT | Performed by: FAMILY MEDICINE

## 2023-10-09 NOTE — PATIENT INSTRUCTIONS
BLOOD PRESSURE CUFF    Get a blood pressure cuff that goes on the arm. Do not get the wrist cuff. Check your blood pressures on your left arm 2-3 times per week at home. Do this with your left arm supported by a table, do not have it hanging down at your side. Make sure you do this before you have any coffee or caffeine. Go ahead and write these numbers down a piece of paper for me. After you get about 10 to 14 days worth of numbers send them to me through [address] [employer] is a very reputable brand.

## 2023-10-11 ENCOUNTER — HOSPITAL ENCOUNTER (OUTPATIENT)
Dept: GENERAL RADIOLOGY | Facility: HOSPITAL | Age: 58
Discharge: HOME | End: 2023-10-11
Attending: FAMILY MEDICINE
Payer: COMMERCIAL

## 2023-10-11 DIAGNOSIS — G89.29 CHRONIC LEFT HIP PAIN: ICD-10-CM

## 2023-10-11 DIAGNOSIS — M25.552 CHRONIC LEFT HIP PAIN: ICD-10-CM

## 2023-10-11 PROCEDURE — 73502 X-RAY EXAM HIP UNI 2-3 VIEWS: CPT | Performed by: FAMILY MEDICINE

## 2024-05-20 ENCOUNTER — OFFICE VISIT (OUTPATIENT)
Dept: FAMILY MEDICINE CLINIC | Facility: CLINIC | Age: 59
End: 2024-05-20

## 2024-05-20 VITALS
DIASTOLIC BLOOD PRESSURE: 85 MMHG | WEIGHT: 172.19 LBS | HEART RATE: 94 BPM | HEIGHT: 66 IN | BODY MASS INDEX: 27.67 KG/M2 | TEMPERATURE: 97 F | SYSTOLIC BLOOD PRESSURE: 121 MMHG

## 2024-05-20 DIAGNOSIS — M25.511 CHRONIC PAIN IN RIGHT SHOULDER: ICD-10-CM

## 2024-05-20 DIAGNOSIS — M25.611 DECREASED RANGE OF MOTION OF RIGHT SHOULDER: ICD-10-CM

## 2024-05-20 DIAGNOSIS — M19.011 ARTHRITIS OF RIGHT SHOULDER REGION: ICD-10-CM

## 2024-05-20 DIAGNOSIS — M72.2 PLANTAR FASCIITIS, RIGHT: ICD-10-CM

## 2024-05-20 DIAGNOSIS — M79.671 PAIN OF RIGHT HEEL: ICD-10-CM

## 2024-05-20 DIAGNOSIS — G89.29 CHRONIC PAIN IN RIGHT SHOULDER: ICD-10-CM

## 2024-05-20 DIAGNOSIS — Z00.00 ADULT GENERAL MEDICAL EXAM: Primary | ICD-10-CM

## 2024-05-20 NOTE — PROGRESS NOTES
Patient ID: Jorge Dumont is a 59 year old male.    HPI  Chief Complaint   Patient presents with    Routine Physical     He states he took his blood pressure at home yesterday morning and it was 118/84.  He awakes sometimes 2 times a night to urinate and sometimes 0.  It depends on how much fluid he drinks.    He is still working as a san.  We had ordered a right shoulder MRI sometime back but he never did it as he was not ready to get any type of procedure done by Dr. Bebo Hopson the orthopedic specialist.  He states he needs to talk to him to discuss if it is worth it to get the surgery done or not as he has very decreased range of motion of the shoulder after years of being a san.    He states for 3 months now he has had right heel pain especially in the morning or if he sits for a while and then gets up to walk.  Not on the left side.  He just bought new shoes for work and even has heel cups in them but it continues to be bothersome.        Health Maintenance   Topic Date Due    Pneumococcal Vaccine: Birth to 62yrs (1 of 2 - PCV) Never done    Tobacco Cessation Counseling  Never done    Annual Physical  03/28/2024    HTN: BP Follow-Up  06/20/2024    Influenza Vaccine (Season Ended) 10/01/2024    Colorectal Cancer Screening  12/14/2024    PSA  04/08/2025    DTaP,Tdap,and Td Vaccines (2 - Td or Tdap) 11/24/2027    Annual Depression Screening  Completed    Zoster Vaccines  Completed    COVID-19 Vaccine  Completed       =======================================================    Lab Results   Component Value Date    WBC 7.3 04/08/2023    RBC 4.95 04/08/2023    HGB 14.9 04/08/2023    HCT 46.0 04/08/2023    .0 04/08/2023    MPV 7.3 (L) 03/14/2015    MCV 92.9 04/08/2023    MCH 30.1 04/08/2023    MCHC 32.4 04/08/2023    RDW 12.3 04/08/2023    NEPRELIM 4.66 04/08/2023    NEUT 3.8 03/14/2015    LYMPH 1.4 03/14/2015    MON 0.6 03/14/2015    EOS 0.1 03/14/2015    BASO 0.0 03/14/2015    NEPERCENT 63.6  04/08/2023    LYPERCENT 23.0 04/08/2023    MOPERCENT 9.4 04/08/2023    EOPERCENT 2.9 04/08/2023    BAPERCENT 0.7 04/08/2023    NE 4.66 04/08/2023    LYMABS 1.68 04/08/2023    MOABSO 0.69 04/08/2023    EOABSO 0.21 04/08/2023    BAABSO 0.05 04/08/2023       Lab Results   Component Value Date    GLU 99 04/08/2023    BUN 16 04/08/2023    BUNCREA 16.5 04/08/2023    CREATSERUM 0.97 04/08/2023    ANIONGAP 4 04/08/2023    GFRNAA 71 01/06/2020    GFRAA 83 01/06/2020    CA 8.8 04/08/2023    OSMOCALC 293 04/08/2023    ALKPHO 102 04/08/2023    AST 31 04/08/2023    ALT 39 04/08/2023    ALKPHOS 88 03/14/2015    BILT 0.5 04/08/2023    TP 7.2 04/08/2023    ALB 3.5 04/08/2023    GLOBULIN 3.7 04/08/2023    AGRATIO 1.3 03/14/2015     04/08/2023    K 4.2 04/08/2023     04/08/2023    CO2 27.0 04/08/2023       Lab Results   Component Value Date    GLU 99 04/08/2023    BUN 16 04/08/2023    CREATSERUM 0.97 04/08/2023    BUNCREA 16.5 04/08/2023    ANIONGAP 4 04/08/2023    GFRAA 83 01/06/2020    GFRNAA 71 01/06/2020    CA 8.8 04/08/2023     04/08/2023    K 4.2 04/08/2023     04/08/2023    CO2 27.0 04/08/2023    OSMOCALC 293 04/08/2023       No results found for: \"COLORUR\", \"CLARITY\", \"SPECGRAVITY\", \"GLUUR\", \"BILUR\", \"KETUR\", \"BLOODURINE\", \"PHURINE\", \"PROUR\", \"UROBILINOGEN\", \"NITRITE\", \"LEUUR\", \"ASCACIDURINE\", \"NMIC\", \"WBCUR\", \"RBCUR\", \"EPIUR\", \"HYALCASTURN\", \"BACUR\", \"CAOXUR\", \"HYLUR\", \"MICCOM\"  No results found for: \"EAG\", \"A1C\"    No results found for: \"MALBP\", \"CREUR\", \"CREAURINE\", \"MIALBURINE\", \"MCRRATIOUR\", \"MALBCRECALC\", \"MICROALBUMIN\", \"CREAUR\", \"MALBCREACALC\"    Lab Results   Component Value Date    CHOLEST 204 (H) 04/08/2023    TRIG 56 04/08/2023    HDL 80 (H) 04/08/2023     (H) 04/08/2023    VLDL 10 04/08/2023    NONHDLC 124 04/08/2023    CALCNONHDL 138 (H) 03/14/2015     TSH (mIU/mL)   Date Value   04/08/2023 2.480     TSH (S) (uIU/mL)   Date Value   03/14/2015 1.49       No results found for: \"B12\",  \"VITB12\"    No results found for: \"IRON\", \"IRONTOT\"    No results found for: \"SAT\"    No results found for: \"IRON\", \"IRONTOT\", \"TIBC\", \"IRONSAT\", \"TRANSFERRIN\", \"TIBCP\", \"IRONBIND\", \"SAT\", \"SATUR\"    No results found for: \"AMERICA\"    No results found for: \"VITD\", \"QVITD\", \"[id number]\"  Lab Results   Component Value Date    TOTPSASCREEN 1.7 03/14/2015       =======================================================    Wt Readings from Last 6 Encounters:   05/20/24 172 lb 3.2 oz (78.1 kg)   10/09/23 172 lb (78 kg)   03/28/23 175 lb (79.4 kg)   05/10/22 170 lb (77.1 kg)   12/14/21 170 lb (77.1 kg)   07/29/21 164 lb 3.2 oz (74.5 kg)               BMI Readings from Last 6 Encounters:   05/20/24 27.79 kg/m²   10/09/23 27.76 kg/m²   03/28/23 28.25 kg/m²   05/10/22 27.44 kg/m²   12/14/21 27.44 kg/m²   07/29/21 26.50 kg/m²       BP Readings from Last 6 Encounters:   05/20/24 151/83   10/09/23 137/90   03/28/23 132/85   12/14/21 112/85   07/29/21 132/78   01/06/20 (!) 135/94         Review of Systems  No chest pain, shortness of breath.    Past Medical History:    DVT (deep venous thrombosis) ([facility])    RLE; popliteal vein    Injury of musculoskeletal system    per [employer]:  \"Bicep and tendon tear\"    Lower back pain    Osteonecrosis of femur due to previous trauma, right ([facility])       Past Surgical History:   Procedure Laterality Date    Anesth,surgery of shoulder  2007    Back surgery  6/2014    cortisone injection    Colonoscopy N/A 12/14/2021    Procedure: COLONOSCOPY;  Surgeon: John Davalos MD;  Location: [facility] ENDOSCOPY     pr closed tx post hip arthroplasty disloc no anesth Right 05/09/2016    Musculoskeletal surgery unlisted      per [employer]:  \"repair (of bicep and tendon tear)       Social History     Socioeconomic History    Marital status:      Spouse name: Not on file    Number of children: Not on file    Years of education: Not on file    Highest education level: Not on file   Occupational History    Not  on file   Tobacco Use    Smoking status: Every Day     Types: Cigars    Smokeless tobacco: Former     Types: Chew    Tobacco comments:     1 cigar a day. quit chewing tobacco in 2010; former 20 yrs of use   Vaping Use    Vaping status: Never Used   Substance and Sexual Activity    Alcohol use: Yes     Alcohol/week: 0.0 standard drinks of alcohol     Comment: socially    Drug use: No    Sexual activity: Not on file   Other Topics Concern     Service Not Asked    Blood Transfusions Not Asked    Caffeine Concern Yes     Comment: 2 cups coffee daily    Occupational Exposure Not Asked    Hobby Hazards Not Asked    Sleep Concern Yes    Stress Concern Not Asked    Weight Concern Not Asked    Special Diet Not Asked    Back Care Not Asked    Exercise Yes     Comment: walking daily    Bike Helmet Not Asked    Seat Belt Not Asked    Self-Exams Not Asked    Left Handed Not Asked    Right Handed Yes    Currently spends a great deal of time in the sun Not Asked    Past Sunlamp Treatments for Acne Not Asked    History of tanning Not Asked    Hx of Spending Great Deal of Time in Sun Not Asked    Bad sunburns in the past Not Asked    Tanning Salons in the Past Not Asked    Hx of Radiation Treatments Not Asked    Regular use of sun block Not Asked   Social History Narrative    Luis Miguel is  x 31 yrs; Elizabeth. Father of 2 adult daughters. He works as a san. 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 Patient plays golf regularly.         The patient does not use an assistive device..      The patient does live in a home with stairs.         Social Determinants of Health     Financial Resource Strain: Not on file   Food Insecurity: Not on file   Transportation Needs: Not on file   Physical Activity: Not on file   Stress: Not on file   Social Connections: Not on file   Housing Stability: Not on file          No current outpatient medications on file.     Allergies:No Known Allergies   PHYSICAL EXAM:   Physical  Exam  Physical Exam   Constitutional: He appears well-developed and well-nourished. No distress.   HENT:   Head: Normocephalic.   Right Ear: Tympanic membrane and ear canal normal.   Left Ear: Tympanic membrane and ear canal normal.   Mouth/Throat: Oropharynx is clear and moist and mucous membranes are normal.   Eyes: Conjunctivae and EOM are normal. Pupils are equal, round, and reactive to light.   Neck: Normal range of motion. Neck supple. No thyromegaly present.   Cardiovascular: Normal rate, regular rhythm and no  murmur heard.  Pulmonary/Chest: Effort normal and breath sounds normal. No respiratory distress.   Abdominal: Soft. Bowel sounds are normal. There is no hepatosplenomegaly. There is no tenderness.   Lymphadenopathy:     He has no cervical adenopathy.   Neurological: He is alert and oriented to person, place, and time. He has normal reflexes. No cranial nerve deficit.   Skin: Skin is warm and dry. No rash noted.   Psychiatric: He has a normal mood and affect.   Right shoulder: Decreased active and passive range of motion.  He has got quite a bit of crepitus with range of motion along with pain.  Right heel: Tender at the medial calcaneus with deep palpation.  Otherwise Achilles is intact.  Good pulses.  No swelling.  Vitals reviewed.    Blood pressure 151/83, pulse 94, temperature 97.3 °F (36.3 °C), temperature source Temporal, height 5' 6\" (1.676 m), weight 172 lb 3.2 oz (78.1 kg).  Vitals:    05/20/24 1403 05/20/24 1422   BP: 151/83 121/85   Pulse: 94    Temp: 97.3 °F (36.3 °C)    TempSrc: Temporal    Weight: 172 lb 3.2 oz (78.1 kg)    Height: 5' 6\" (1.676 m)             ASSESSMENT/PLAN:     Diagnoses and all orders for this visit:    Adult general medical exam  -     CBC With Differential With Platelet; Future  -     Comp Metabolic Panel (14); Future  -     Lipid Panel; Future  -     Assay, Thyroid Stim Hormone; Future  -     PSA Total, Screen; Future  Labs ordered.  He will return to clinic  fasting  Chronic pain in right shoulder  -     z [employer] MRI SHOULDER, RIGHT (CPT=73221); Future  I suspect he will have severe arthritis in the right shoulder.  X-rays already been done in the past.  He needs an MRI of his right shoulder.  Decreased range of motion of right shoulder  -     z [employer]ight MRI SHOULDER, RIGHT (CPT=73221); Future    Arthritis of right shoulder region  -     z [employer]ight MRI SHOULDER, RIGHT (CPT=73221); Future    Pain of right heel  Patient Instructions                                       PLANTAR FASCIITIS HEEL PAIN PLAN    Ice heel and can even use golf balls that have been in freezer.  Do stretches of arch of foot with a towel or against wall.  Before getting out of bed in the morning you may want to roll the bottom of your foot on a rolling pin or tennis ball to stretch it out before you get up and take your first step.  You may also want to go on the Internet and look up plantar fasciitis stretching exercises.      Plantar fasciitis, right  As above      Referrals (if applicable)  No orders of the defined types were placed in this encounter.        Follow up if symptoms persist.  Take medicine (if given) as prescribed.  Approach to treatment discussed and patient/family member understands and agrees to plan.     No follow-ups on file.      Roddy Meredith DO  5/20/2024      .

## 2024-05-20 NOTE — PATIENT INSTRUCTIONS
PLANTAR FASCIITIS HEEL PAIN PLAN    Ice heel and can even use golf balls that have been in freezer.  Do stretches of arch of foot with a towel or against wall.  Before getting out of bed in the morning you may want to roll the bottom of your foot on a rolling pin or tennis ball to stretch it out before you get up and take your first step.  You may also want to go on the Internet and look up plantar fasciitis stretching exercises.

## 2024-06-01 ENCOUNTER — LAB ENCOUNTER (OUTPATIENT)
Dept: LAB | Facility: HOSPITAL | Age: 59
End: 2024-06-01
Attending: FAMILY MEDICINE
Payer: COMMERCIAL

## 2024-06-01 DIAGNOSIS — Z00.00 ADULT GENERAL MEDICAL EXAM: ICD-10-CM

## 2024-06-01 LAB
ALBUMIN SERPL-MCNC: 4.3 G/DL (ref 3.2–4.8)
ALBUMIN/GLOB SERPL: 1.6 {RATIO} (ref 1–2)
ALP LIVER SERPL-CCNC: 99 U/L
ALT SERPL-CCNC: 30 U/L
ANION GAP SERPL CALC-SCNC: 7 MMOL/L (ref 0–18)
AST SERPL-CCNC: 29 U/L (ref ?–34)
BASOPHILS # BLD AUTO: 0.03 X10(3) UL (ref 0–0.2)
BASOPHILS NFR BLD AUTO: 0.5 %
BILIRUB SERPL-MCNC: 0.7 MG/DL (ref 0.2–1.1)
BUN BLD-MCNC: 14 MG/DL (ref 9–23)
BUN/CREAT SERPL: 14.1 (ref 10–20)
CALCIUM BLD-MCNC: 9.2 MG/DL (ref 8.7–10.4)
CHLORIDE SERPL-SCNC: 109 MMOL/L (ref 98–112)
CHOLEST SERPL-MCNC: 230 MG/DL (ref ?–200)
CO2 SERPL-SCNC: 25 MMOL/L (ref 21–32)
COMPLEXED PSA SERPL-MCNC: 1.39 NG/ML (ref ?–4)
CREAT BLD-MCNC: 0.99 MG/DL
DEPRECATED RDW RBC AUTO: 45.6 FL (ref 35.1–46.3)
EGFRCR SERPLBLD CKD-EPI 2021: 87 ML/MIN/1.73M2 (ref 60–?)
EOSINOPHIL # BLD AUTO: 0.17 X10(3) UL (ref 0–0.7)
EOSINOPHIL NFR BLD AUTO: 2.7 %
ERYTHROCYTE [DISTWIDTH] IN BLOOD BY AUTOMATED COUNT: 12.9 % (ref 11–15)
FASTING PATIENT LIPID ANSWER: YES
FASTING STATUS PATIENT QL REPORTED: YES
GLOBULIN PLAS-MCNC: 2.7 G/DL (ref 2–3.5)
GLUCOSE BLD-MCNC: 85 MG/DL (ref 70–99)
HCT VFR BLD AUTO: 47.6 %
HDLC SERPL-MCNC: 61 MG/DL (ref 40–59)
HGB BLD-MCNC: 15.2 G/DL
IMM GRANULOCYTES # BLD AUTO: 0.03 X10(3) UL (ref 0–1)
IMM GRANULOCYTES NFR BLD: 0.5 %
LDLC SERPL CALC-MCNC: 161 MG/DL (ref ?–100)
LYMPHOCYTES # BLD AUTO: 1.52 X10(3) UL (ref 1–4)
LYMPHOCYTES NFR BLD AUTO: 24.1 %
MCH RBC QN AUTO: 30.7 PG (ref 26–34)
MCHC RBC AUTO-ENTMCNC: 31.9 G/DL (ref 31–37)
MCV RBC AUTO: 96.2 FL
MONOCYTES # BLD AUTO: 0.59 X10(3) UL (ref 0.1–1)
MONOCYTES NFR BLD AUTO: 9.3 %
NEUTROPHILS # BLD AUTO: 3.98 X10 (3) UL (ref 1.5–7.7)
NEUTROPHILS # BLD AUTO: 3.98 X10(3) UL (ref 1.5–7.7)
NEUTROPHILS NFR BLD AUTO: 62.9 %
NONHDLC SERPL-MCNC: 169 MG/DL (ref ?–130)
OSMOLALITY SERPL CALC.SUM OF ELEC: 292 MOSM/KG (ref 275–295)
PLATELET # BLD AUTO: 336 10(3)UL (ref 150–450)
POTASSIUM SERPL-SCNC: 4.1 MMOL/L (ref 3.5–5.1)
PROT SERPL-MCNC: 7 G/DL (ref 5.7–8.2)
RBC # BLD AUTO: 4.95 X10(6)UL
SODIUM SERPL-SCNC: 141 MMOL/L (ref 136–145)
TRIGL SERPL-MCNC: 51 MG/DL (ref 30–149)
TSI SER-ACNC: 2.19 MIU/ML (ref 0.55–4.78)
VLDLC SERPL CALC-MCNC: 10 MG/DL (ref 0–30)
WBC # BLD AUTO: 6.3 X10(3) UL (ref 4–11)

## 2024-06-01 PROCEDURE — 85025 COMPLETE CBC W/AUTO DIFF WBC: CPT

## 2024-06-01 PROCEDURE — 36415 COLL VENOUS BLD VENIPUNCTURE: CPT

## 2024-06-01 PROCEDURE — 84443 ASSAY THYROID STIM HORMONE: CPT

## 2024-06-01 PROCEDURE — 80053 COMPREHEN METABOLIC PANEL: CPT

## 2024-06-01 PROCEDURE — 80061 LIPID PANEL: CPT

## 2024-10-30 ENCOUNTER — TELEPHONE (OUTPATIENT)
Dept: GASTROENTEROLOGY | Facility: CLINIC | Age: 59
End: 2024-10-30

## 2024-10-30 DIAGNOSIS — Z80.0 FAMILY HISTORY OF COLON CANCER: ICD-10-CM

## 2024-10-30 DIAGNOSIS — Z86.0100 HX OF COLONIC POLYP: Primary | ICD-10-CM

## 2024-10-30 NOTE — TELEPHONE ENCOUNTER
Patients wife called to schedule rep colonoscopy that is due in Dec 2024.  Patient would like to schedule sometime in Feb 2025.  Please call.

## 2024-10-30 NOTE — TELEPHONE ENCOUNTER
Left message to call back    Last Procedure, Date, MD: Colonoscopy, Dr. Davalos, 12/14/2021  Last Diagnosis:  Hyperplastic polyp.  Recalled (mth/yrs):  3 years  Sedation Used Previously:  MAC  Last Prep Used (if known):  Golytely  Quality Of Prep (if known): excellent   Anticoagulants:  Diuretics:   Diabetic Med's (PO/Injectables):   Weight loss Med's:  Iron/Herbal/Multivitamin Supplements (RX/OTC):  Marijuana/Vaping/CBD:  Height & Weight: 5' 6\" 172 lbs  BMI: 27.81  Hx of Cardiac/CVA Issues/(MI/Stroke):  Devices Pacemaker/Defibrillator/Stents:  Respiratory Issues/Oxygen Use/YOGESH/COPD:  Issues w/ Anesthesia:    Symptoms (Y/N):   Symptoms Details:     Special Comments/Notes: Last PCP visit 5/20/2024, pharmacy verified     Please advise on orders and prep.     Thank you!          Signed         COLONOSCOPY PROCEDURE REPORT     DATE OF PROCEDURE:  12/14/2021      PCP: Roddy Meredith DO     PREOPERATIVE DIAGNOSIS:  history high risk adenomatous colon polyps     POSTOPERATIVE DIAGNOSIS:  See impression.     SURGEON:  John Davalos M.D.     SEDATION:    MAC anesthesia provided by the Anesthesia Service.  MAC anesthesia requested due to anticipated intolerance of colonoscopy examination under safe doses conscious sedation medications        COLONOSCOPY PROCEDURE:   After the nature and risks of colonoscopy examination under MAC anesthesia were discussed with the patient and all questions answered, informed consent was obtained.  The patient was sedated as above.       Digital rectal exam was performed which showed no masses.  The Olympus pediatric video colonoscope was placed in the patient's rectum and advanced under direct visualization through the entire length of the colon up to the cecum.  Unable to retroflex in the ascending colon today safely; several trips therefore completed between the cecum and the hepatic flexure.  The cecum was confirmed by landmarks including appendiceal orifice, cecal trifold,  ileocecal valve.  Retroflexion was performed in the rectum.     The quality of the prep was excellent.     Estimated blood loss: none/insignificant        COLONOSCOPY FINDINGS:    Typical benign polypectomy scar mid sigmoid colon, no recurrence  Diminutive 4 mm polyp removed from mid transverse colon by cold snare polypectomy, suctioned out.  Mild diverticulosis of the descending and sigmoid colon.  Small internal hemorrhoids.     RECOMMENDATIONS:  High fiber diet.  Follow-up above colon polyp pathology results.  Repeat colonoscopy examination in 3 years.  No aspirin or NSAID medications for next 5 days to prevent bleeding            Electronically signed by John Davalos MD at 12/14/2021  9:52 AM      Final Diagnosis:      Transverse colon polyp x1:   Hyperplastic polyp.

## 2024-11-05 RX ORDER — POLYETHYLENE GLYCOL 3350, SODIUM SULFATE ANHYDROUS, SODIUM BICARBONATE, SODIUM CHLORIDE, POTASSIUM CHLORIDE 236; 22.74; 6.74; 5.86; 2.97 G/4L; G/4L; G/4L; G/4L; G/4L
4 POWDER, FOR SOLUTION ORAL ONCE
Qty: 1 EACH | Refills: 0 | Status: SHIPPED | OUTPATIENT
Start: 2024-11-05 | End: 2024-11-05

## 2024-11-05 NOTE — TELEPHONE ENCOUNTER
Thanks all.    PCP adult general medical exam visit Dr. Roddy Meredith 5/20/2024 reviewed.  Healthy gentleman.    My previous colonoscopy reports 12/14/2021 and 10/7/2020 reviewed.  Previous history high risk adenomatous colon polyps, family history colon cancer.    GI schedulers -    Please schedule colonoscopy exam at [facility] [place] ([facility])    BMI Readings from Last 1 Encounters:   05/20/24 27.79 kg/m²     MAC anesthesia     BP Readings from Last 5 Encounters:   05/20/24 121/85   10/09/23 137/90   03/28/23 132/85   12/14/21 112/85   07/29/21 132/78       Golytely (PEG) 4L bowel prep  Rx sent in to [facility]      DX = history high risk serrated adenoma colon polyps, family history colon cancer in a parent    Medication instructions:    Hold Cialis/Tadalafil or Viagra/sildenafil or Levitra/Vardenafil, Stendra/Stendra medication > 3 days prior to procedure

## 2024-11-05 NOTE — TELEPHONE ENCOUNTER
Last Procedure, Date, MD: Colonoscopy, Dr. Davalos, 12/14/2021  Last Diagnosis:  Hyperplastic polyp.  Recalled (mth/yrs):  3 years  Sedation Used Previously:  MAC  Last Prep Used (if known):  Golytely  Quality Of Prep (if known): excellent   Anticoagulants:no  Diuretics: no  Diabetic Med's (PO/Injectables): no  Weight loss Med's:no  Iron/Herbal/Multivitamin Supplements (RX/OTC):no  Marijuana/Vaping/CBD:  Height & Weight: 5' 6\" 172 lbs  BMI: 27.81  Hx of Cardiac/CVA Issues/(MI/Stroke): no  Devices Pacemaker/Defibrillator/Stents: no  Respiratory Issues/Oxygen Use/YOGESH/COPD: no  Issues w/ Anesthesia:no     Symptoms (Y/N): no  Symptoms Details: N/A     Special Comments/Notes: Last PCP visit 5/20/2024, pharmacy verified. Medications allergies and pharmacy verified     Please advise on orders and prep.      Thank you!

## 2024-11-07 NOTE — TELEPHONE ENCOUNTER
Scheduled for:  Colonoscopy [id number]  Provider Name:     Date:  Friday, 04/11/2025  Location:  [facility]  Sedation:  MAC  Time:  745AM (pt is aware Endo will call with arrival time     Prep:  Golytely   Meds/Allergies Reconciled?:  Yes    Diagnosis with codes:   history high risk serrated adenoma colon polyps Z86.010 family history colon cancer in a parent Z80.0     Was patient informed to call insurance with codes (Y/N):  Yes     Referral sent?:  Referral was sent at the time of electronic surgical scheduling.    [facility] or [facility] notified?:  I sent an electronic request to Endo Scheduling and received a confirmation today.       Medication Orders:  Hold Cialis/Tadalafil or Viagra/sildenafil or Levitra/Vardenafil, Stendra/Stendra medication > 3 days prior to procedure   Misc Orders:  None     Further instructions given by staff:  I discussed the prep instructions with the patient which he verbally understood and is aware that I will send the instructions today.via [employer]

## 2024-12-19 ENCOUNTER — TELEPHONE (OUTPATIENT)
Dept: FAMILY MEDICINE CLINIC | Facility: CLINIC | Age: 59
End: 2024-12-19

## 2024-12-19 ENCOUNTER — OFFICE VISIT (OUTPATIENT)
Dept: FAMILY MEDICINE CLINIC | Facility: CLINIC | Age: 59
End: 2024-12-19
Payer: COMMERCIAL

## 2024-12-19 VITALS
BODY MASS INDEX: 28.45 KG/M2 | HEART RATE: 84 BPM | SYSTOLIC BLOOD PRESSURE: 110 MMHG | TEMPERATURE: 98 F | WEIGHT: 177 LBS | HEIGHT: 66 IN | DIASTOLIC BLOOD PRESSURE: 80 MMHG

## 2024-12-19 DIAGNOSIS — G89.29 CHRONIC PAIN IN RIGHT SHOULDER: ICD-10-CM

## 2024-12-19 DIAGNOSIS — M19.011 ARTHRITIS OF RIGHT SHOULDER REGION: Primary | ICD-10-CM

## 2024-12-19 DIAGNOSIS — M25.511 CHRONIC PAIN IN RIGHT SHOULDER: ICD-10-CM

## 2024-12-19 DIAGNOSIS — Z01.818 PRE-OP EVALUATION: ICD-10-CM

## 2024-12-19 PROCEDURE — 3079F DIAST BP 80-89 MM HG: CPT | Performed by: FAMILY MEDICINE

## 2024-12-19 PROCEDURE — 3008F BODY MASS INDEX DOCD: CPT | Performed by: FAMILY MEDICINE

## 2024-12-19 PROCEDURE — 3074F SYST BP LT 130 MM HG: CPT | Performed by: FAMILY MEDICINE

## 2024-12-19 PROCEDURE — 99214 OFFICE O/P EST MOD 30 MIN: CPT | Performed by: FAMILY MEDICINE

## 2024-12-19 RX ORDER — AMOXICILLIN 500 MG/1
CAPSULE ORAL
COMMUNITY
Start: 2024-08-29

## 2024-12-19 NOTE — TELEPHONE ENCOUNTER
Pre-op clearance faxed to [facility] orthopaedics to fax number:[phone], confirmation received.

## 2024-12-19 NOTE — PROGRESS NOTES
Patient ID: Jorge Dumont is a 59 year old male.    HPI  Chief Complaint   Patient presents with    Pre-Op Exam     Shoulder surgery     He is here for preoperative physical exam for right shoulder pain that is been quite chronic and debilitating.1/8/25.  The surgery will be done by Dr. Bebo Hopson on January 8, 2025.  He states all the testing has already been ordered by them including blood work, nasal swab, EKG etc . He states all he needs for me as an exam today.  He does not have any bleeding disorders and no anesthesia complications.  He has had surgery in the past and done very well.  He does not smoke cigarettes.  He is a san.        Wt Readings from Last 6 Encounters:   12/19/24 177 lb (80.3 kg)   05/20/24 172 lb 3.2 oz (78.1 kg)   10/09/23 172 lb (78 kg)   03/28/23 175 lb (79.4 kg)   05/10/22 170 lb (77.1 kg)   12/14/21 170 lb (77.1 kg)       BMI Readings from Last 6 Encounters:   12/19/24 28.57 kg/m²   05/20/24 27.79 kg/m²   10/09/23 27.76 kg/m²   03/28/23 28.25 kg/m²   05/10/22 27.44 kg/m²   12/14/21 27.44 kg/m²       BP Readings from Last 6 Encounters:   05/20/24 121/85   10/09/23 137/90   03/28/23 132/85   12/14/21 112/85   07/29/21 132/78   01/06/20 (!) 135/94         Review of Systems  See HPI above      Medical History:      Past Medical History:    DVT (deep venous thrombosis) ([facility])    RLE; popliteal vein    Injury of musculoskeletal system    per [employer]:  \"Bicep and tendon tear\"    Lower back pain    Osteonecrosis of femur due to previous trauma, right ([facility])       Past Surgical History:   Procedure Laterality Date    Anesth,surgery of shoulder  2007    Back surgery  6/2014    cortisone injection    Colonoscopy N/A 12/14/2021    Procedure: COLONOSCOPY;  Surgeon: John Davalos MD;  Location: [facility] ENDOSCOPY     pr closed tx post hip arthroplasty disloc no anesth Right 05/09/2016    Musculoskeletal surgery unlisted      per [employer]:  \"repair (of bicep and tendon tear)           Current Outpatient Medications   Medication Sig Dispense Refill    amoxicillin 500 MG Oral Cap TAKE 4 CAPSULES BY MOUTH ONE HOUR PRIOR TO APPOINTMENT       Allergies:Allergies[1]     Physical Exam:       Physical Exam  Blood pressure 110/80, pulse 84, temperature 98 °F (36.7 °C), height 5' 6\" (1.676 m), weight 177 lb (80.3 kg).  Physical Exam   Constitutional: Patient is oriented to person, place, and time. Patient appears well-developed and well-nourished. No distress.   HENT:   Head: Normocephalic and atraumatic.   Oropharynx is clear without obstruction or dentures or removable teeth.  Neck: Normal range of motion. Neck supple. No thyromegaly present.   Lymphadenopathy:     Has  no cervical adenopathy.   Cardiovascular: Normal rate, regular rhythm and no murmur heard.  Pulmonary/Chest: Effort normal and breath sounds normal. No respiratory distress.   Neurological: Patient is alert and oriented to person, place, and time.   Skin: Skin is warm and dry.   Psychiatric: Patient has a normal mood and affect.   No edema of the legs  Nursing note and vitals reviewed.           Assessment/Plan:      Diagnoses and all orders for this visit:    Arthritis of right shoulder region  Thank you Dr. Hopson for allowing me to be a part of Jorge's care.  I understand that you have already ordered labs for him.  From his exam today he is cleared for his procedure as long as his labs are stable.  Chronic pain in right shoulder  As above  Pre-op evaluation  Paperwork filled out.      Referrals (if applicable)  No orders of the defined types were placed in this encounter.        Follow up if symptoms persist.  Take medicine (if given) as prescribed.  Approach to treatment discussed and patient/family member understands and agrees to plan.     No follow-ups on file.        Roddy Meredith DO  12/19/2024       [1] No Known Allergies

## 2025-01-02 RX ORDER — OMEGA-3 FATTY ACIDS/FISH OIL 300-1000MG
1 CAPSULE ORAL AS NEEDED
COMMUNITY

## 2025-01-02 RX ORDER — ACETAMINOPHEN 500 MG
500 TABLET ORAL EVERY 6 HOURS PRN
COMMUNITY

## 2025-01-03 NOTE — H&P
[facility]    PATIENT'S NAME: RAPHAEL ZUNIGA   ATTENDING PHYSICIAN: Bebo Hopson MD   PATIENT ACCOUNT#:   [id number]    LOCATION:    MEDICAL RECORD #:   [id number]       YOB: 1965  ADMISSION DATE:       01/08/2025    HISTORY AND PHYSICAL EXAMINATION    HISTORY OF PRESENT ILLNESS:  The patient is a 59-year-old gentleman who is being followed for right end-stage glenohumeral joint osteoarthritis.  He has failed conservative management.  At this time, he is interested in surgery.    PAST MEDICAL HISTORY:  Significant for DVT.    PAST SURGICAL HISTORY:  Shoulder surgery, spine surgery.    MEDICATIONS:  Current medications:  Amoxicillin.    ALLERGIES:  None.    PHYSICAL EXAMINATION:  His clinical examination demonstrates elevation to 100, external rotation to 20, internal rotation to gluteus.  He has crepitus with range of motion exercises.  Anterior and posterior joint line tenderness.    X-rays demonstrate advanced glenohumeral joint osteoarthritis with medialization of his glenoid.     ASSESSMENT AND PLAN:  The patient is a 59-year-old gentleman with advanced glenohumeral joint osteoarthritis.  At this time, we will proceed with a right anatomic total shoulder replacement.  We discussed all the risks and complications associated with surgery, and he would like to proceed at this time.     Margarita Jackson PA-C, dictating for Bebo Hopson MD.     Dictated By Bebo Hopson MD  d: 01/02/2025 16:46:39  t: 01/02/2025 18:22:45  Job [id number]  /

## 2025-01-08 ENCOUNTER — APPOINTMENT (OUTPATIENT)
Dept: GENERAL RADIOLOGY | Facility: HOSPITAL | Age: 60
End: 2025-01-08
Attending: ORTHOPAEDIC SURGERY
Payer: COMMERCIAL

## 2025-01-08 ENCOUNTER — HOSPITAL ENCOUNTER (OUTPATIENT)
Facility: HOSPITAL | Age: 60
Discharge: HOME | End: 2025-01-09
Attending: ORTHOPAEDIC SURGERY | Admitting: ORTHOPAEDIC SURGERY
Payer: COMMERCIAL

## 2025-01-08 ENCOUNTER — ANESTHESIA (OUTPATIENT)
Dept: SURGERY | Facility: HOSPITAL | Age: 60
End: 2025-01-08
Payer: COMMERCIAL

## 2025-01-08 ENCOUNTER — ANESTHESIA EVENT (OUTPATIENT)
Dept: SURGERY | Facility: HOSPITAL | Age: 60
End: 2025-01-08
Payer: COMMERCIAL

## 2025-01-08 PROBLEM — M19.019 OSTEOARTHRITIS, SHOULDER: Status: ACTIVE | Noted: 2025-01-08

## 2025-01-08 PROCEDURE — 0RRJ0JZ REPLACEMENT OF RIGHT SHOULDER JOINT WITH SYNTHETIC SUBSTITUTE, OPEN APPROACH: ICD-10-PCS | Performed by: ORTHOPAEDIC SURGERY

## 2025-01-08 PROCEDURE — 0LS30ZZ REPOSITION RIGHT UPPER ARM TENDON, OPEN APPROACH: ICD-10-PCS | Performed by: ORTHOPAEDIC SURGERY

## 2025-01-08 PROCEDURE — 99204 OFFICE O/P NEW MOD 45 MIN: CPT | Performed by: HOSPITALIST

## 2025-01-08 PROCEDURE — 73020 X-RAY EXAM OF SHOULDER: CPT | Performed by: ORTHOPAEDIC SURGERY

## 2025-01-08 DEVICE — FULL DOSE BONE CEMENT, 10 PACK CATALOG NUMBER IS 6191-1-010
Type: IMPLANTABLE DEVICE | Site: SHOULDER | Status: FUNCTIONAL
Brand: SIMPLEX

## 2025-01-08 DEVICE — IMPLANTABLE DEVICE
Type: IMPLANTABLE DEVICE | Site: SHOULDER | Status: FUNCTIONAL
Brand: IDENTITY™

## 2025-01-08 DEVICE — IMPLANTABLE DEVICE
Type: IMPLANTABLE DEVICE | Site: SHOULDER | Status: FUNCTIONAL
Brand: ALLIANCE™ TRABECULAR METAL™

## 2025-01-08 DEVICE — IMPLANTABLE DEVICE
Type: IMPLANTABLE DEVICE | Site: SHOULDER | Status: FUNCTIONAL
Brand: ALLIANCE™

## 2025-01-08 RX ORDER — MAGNESIUM HYDROXIDE 1200 MG/15ML
LIQUID ORAL CONTINUOUS PRN
Status: DISCONTINUED | OUTPATIENT
Start: 2025-01-08 | End: 2025-01-08 | Stop reason: HOSPADM

## 2025-01-08 RX ORDER — HYDROCODONE BITARTRATE AND ACETAMINOPHEN 10; 325 MG/1; MG/1
1 TABLET ORAL EVERY 6 HOURS PRN
Status: DISCONTINUED | OUTPATIENT
Start: 2025-01-08 | End: 2025-01-09

## 2025-01-08 RX ORDER — BISACODYL 10 MG
10 SUPPOSITORY, RECTAL RECTAL
Status: DISCONTINUED | OUTPATIENT
Start: 2025-01-08 | End: 2025-01-09

## 2025-01-08 RX ORDER — ONDANSETRON 2 MG/ML
4 INJECTION INTRAMUSCULAR; INTRAVENOUS EVERY 6 HOURS PRN
Status: DISCONTINUED | OUTPATIENT
Start: 2025-01-08 | End: 2025-01-09

## 2025-01-08 RX ORDER — DOCUSATE SODIUM 100 MG/1
100 CAPSULE, LIQUID FILLED ORAL 2 TIMES DAILY
Status: DISCONTINUED | OUTPATIENT
Start: 2025-01-08 | End: 2025-01-09

## 2025-01-08 RX ORDER — MORPHINE SULFATE 4 MG/ML
6 INJECTION, SOLUTION INTRAMUSCULAR; INTRAVENOUS EVERY 2 HOUR PRN
Status: DISCONTINUED | OUTPATIENT
Start: 2025-01-08 | End: 2025-01-09

## 2025-01-08 RX ORDER — DEXAMETHASONE SODIUM PHOSPHATE 4 MG/ML
VIAL (ML) INJECTION AS NEEDED
Status: DISCONTINUED | OUTPATIENT
Start: 2025-01-08 | End: 2025-01-08 | Stop reason: SURG

## 2025-01-08 RX ORDER — ROCURONIUM BROMIDE 10 MG/ML
INJECTION, SOLUTION INTRAVENOUS AS NEEDED
Status: DISCONTINUED | OUTPATIENT
Start: 2025-01-08 | End: 2025-01-08 | Stop reason: SURG

## 2025-01-08 RX ORDER — ONDANSETRON 2 MG/ML
INJECTION INTRAMUSCULAR; INTRAVENOUS AS NEEDED
Status: DISCONTINUED | OUTPATIENT
Start: 2025-01-08 | End: 2025-01-08 | Stop reason: SURG

## 2025-01-08 RX ORDER — ALBUTEROL SULFATE 0.83 MG/ML
2.5 SOLUTION RESPIRATORY (INHALATION)
Status: DISCONTINUED | OUTPATIENT
Start: 2025-01-08 | End: 2025-01-08 | Stop reason: HOSPADM

## 2025-01-08 RX ORDER — HYDROMORPHONE HYDROCHLORIDE 1 MG/ML
0.6 INJECTION, SOLUTION INTRAMUSCULAR; INTRAVENOUS; SUBCUTANEOUS EVERY 5 MIN PRN
Status: DISCONTINUED | OUTPATIENT
Start: 2025-01-08 | End: 2025-01-08 | Stop reason: HOSPADM

## 2025-01-08 RX ORDER — MORPHINE SULFATE 4 MG/ML
4 INJECTION, SOLUTION INTRAMUSCULAR; INTRAVENOUS EVERY 10 MIN PRN
Status: DISCONTINUED | OUTPATIENT
Start: 2025-01-08 | End: 2025-01-08 | Stop reason: HOSPADM

## 2025-01-08 RX ORDER — SODIUM CHLORIDE, SODIUM LACTATE, POTASSIUM CHLORIDE, CALCIUM CHLORIDE 600; 310; 30; 20 MG/100ML; MG/100ML; MG/100ML; MG/100ML
INJECTION, SOLUTION INTRAVENOUS CONTINUOUS
Status: DISCONTINUED | OUTPATIENT
Start: 2025-01-08 | End: 2025-01-09

## 2025-01-08 RX ORDER — POLYETHYLENE GLYCOL 3350 17 G/17G
17 POWDER, FOR SOLUTION ORAL DAILY PRN
Status: DISCONTINUED | OUTPATIENT
Start: 2025-01-08 | End: 2025-01-09

## 2025-01-08 RX ORDER — MORPHINE SULFATE 4 MG/ML
2 INJECTION, SOLUTION INTRAMUSCULAR; INTRAVENOUS EVERY 10 MIN PRN
Status: DISCONTINUED | OUTPATIENT
Start: 2025-01-08 | End: 2025-01-08 | Stop reason: HOSPADM

## 2025-01-08 RX ORDER — ONDANSETRON 2 MG/ML
4 INJECTION INTRAMUSCULAR; INTRAVENOUS EVERY 6 HOURS PRN
Status: DISCONTINUED | OUTPATIENT
Start: 2025-01-08 | End: 2025-01-08 | Stop reason: HOSPADM

## 2025-01-08 RX ORDER — SODIUM CHLORIDE, SODIUM LACTATE, POTASSIUM CHLORIDE, CALCIUM CHLORIDE 600; 310; 30; 20 MG/100ML; MG/100ML; MG/100ML; MG/100ML
INJECTION, SOLUTION INTRAVENOUS CONTINUOUS
Status: DISCONTINUED | OUTPATIENT
Start: 2025-01-08 | End: 2025-01-08 | Stop reason: HOSPADM

## 2025-01-08 RX ORDER — EPHEDRINE SULFATE 50 MG/ML
INJECTION, SOLUTION INTRAVENOUS AS NEEDED
Status: DISCONTINUED | OUTPATIENT
Start: 2025-01-08 | End: 2025-01-08 | Stop reason: SURG

## 2025-01-08 RX ORDER — MIDAZOLAM HYDROCHLORIDE 1 MG/ML
INJECTION INTRAMUSCULAR; INTRAVENOUS
Status: COMPLETED | OUTPATIENT
Start: 2025-01-08 | End: 2025-01-08

## 2025-01-08 RX ORDER — GLYCOPYRROLATE 0.2 MG/ML
INJECTION, SOLUTION INTRAMUSCULAR; INTRAVENOUS AS NEEDED
Status: DISCONTINUED | OUTPATIENT
Start: 2025-01-08 | End: 2025-01-08 | Stop reason: SURG

## 2025-01-08 RX ORDER — LABETALOL HYDROCHLORIDE 5 MG/ML
5 INJECTION, SOLUTION INTRAVENOUS EVERY 10 MIN PRN
Status: DISCONTINUED | OUTPATIENT
Start: 2025-01-08 | End: 2025-01-08 | Stop reason: HOSPADM

## 2025-01-08 RX ORDER — VANCOMYCIN HYDROCHLORIDE
15 ONCE
Status: COMPLETED | OUTPATIENT
Start: 2025-01-08 | End: 2025-01-08

## 2025-01-08 RX ORDER — OXYCODONE HYDROCHLORIDE 5 MG/1
5 TABLET ORAL EVERY 4 HOURS PRN
Status: DISCONTINUED | OUTPATIENT
Start: 2025-01-08 | End: 2025-01-09

## 2025-01-08 RX ORDER — SENNOSIDES 8.6 MG
17.2 TABLET ORAL NIGHTLY
Status: DISCONTINUED | OUTPATIENT
Start: 2025-01-08 | End: 2025-01-09

## 2025-01-08 RX ORDER — PHENYLEPHRINE HCL 10 MG/ML
VIAL (ML) INJECTION AS NEEDED
Status: DISCONTINUED | OUTPATIENT
Start: 2025-01-08 | End: 2025-01-08 | Stop reason: SURG

## 2025-01-08 RX ORDER — DEXAMETHASONE SODIUM PHOSPHATE 10 MG/ML
INJECTION, SOLUTION INTRAMUSCULAR; INTRAVENOUS
Status: COMPLETED | OUTPATIENT
Start: 2025-01-08 | End: 2025-01-08

## 2025-01-08 RX ORDER — DIPHENHYDRAMINE HYDROCHLORIDE 50 MG/ML
25 INJECTION INTRAMUSCULAR; INTRAVENOUS ONCE AS NEEDED
Status: ACTIVE | OUTPATIENT
Start: 2025-01-08 | End: 2025-01-08

## 2025-01-08 RX ORDER — MORPHINE SULFATE 2 MG/ML
2 INJECTION, SOLUTION INTRAMUSCULAR; INTRAVENOUS EVERY 2 HOUR PRN
Status: DISCONTINUED | OUTPATIENT
Start: 2025-01-08 | End: 2025-01-09

## 2025-01-08 RX ORDER — ROPIVACAINE HYDROCHLORIDE 5 MG/ML
INJECTION, SOLUTION EPIDURAL; INFILTRATION; PERINEURAL
Status: COMPLETED | OUTPATIENT
Start: 2025-01-08 | End: 2025-01-08

## 2025-01-08 RX ORDER — MORPHINE SULFATE 15 MG/1
15 TABLET ORAL EVERY 4 HOURS PRN
Status: DISCONTINUED | OUTPATIENT
Start: 2025-01-08 | End: 2025-01-09

## 2025-01-08 RX ORDER — MORPHINE SULFATE 4 MG/ML
4 INJECTION, SOLUTION INTRAMUSCULAR; INTRAVENOUS EVERY 2 HOUR PRN
Status: DISCONTINUED | OUTPATIENT
Start: 2025-01-08 | End: 2025-01-09

## 2025-01-08 RX ORDER — OXYCODONE HYDROCHLORIDE 5 MG/1
10 TABLET ORAL EVERY 4 HOURS PRN
Status: DISCONTINUED | OUTPATIENT
Start: 2025-01-08 | End: 2025-01-09

## 2025-01-08 RX ORDER — ACETAMINOPHEN 500 MG
1000 TABLET ORAL ONCE
Status: COMPLETED | OUTPATIENT
Start: 2025-01-08 | End: 2025-01-08

## 2025-01-08 RX ORDER — LIDOCAINE HYDROCHLORIDE 10 MG/ML
INJECTION, SOLUTION EPIDURAL; INFILTRATION; INTRACAUDAL; PERINEURAL AS NEEDED
Status: DISCONTINUED | OUTPATIENT
Start: 2025-01-08 | End: 2025-01-08 | Stop reason: SURG

## 2025-01-08 RX ORDER — ACETAMINOPHEN 500 MG
1000 TABLET ORAL EVERY 8 HOURS
Status: COMPLETED | OUTPATIENT
Start: 2025-01-08 | End: 2025-01-09

## 2025-01-08 RX ORDER — PROCHLORPERAZINE EDISYLATE 5 MG/ML
5 INJECTION INTRAMUSCULAR; INTRAVENOUS EVERY 8 HOURS PRN
Status: DISCONTINUED | OUTPATIENT
Start: 2025-01-08 | End: 2025-01-09

## 2025-01-08 RX ORDER — NALOXONE HYDROCHLORIDE 0.4 MG/ML
80 INJECTION, SOLUTION INTRAMUSCULAR; INTRAVENOUS; SUBCUTANEOUS AS NEEDED
Status: DISCONTINUED | OUTPATIENT
Start: 2025-01-08 | End: 2025-01-08 | Stop reason: HOSPADM

## 2025-01-08 RX ORDER — NEOSTIGMINE METHYLSULFATE 1 MG/ML
INJECTION INTRAVENOUS AS NEEDED
Status: DISCONTINUED | OUTPATIENT
Start: 2025-01-08 | End: 2025-01-08 | Stop reason: SURG

## 2025-01-08 RX ORDER — HYDROMORPHONE HYDROCHLORIDE 1 MG/ML
0.2 INJECTION, SOLUTION INTRAMUSCULAR; INTRAVENOUS; SUBCUTANEOUS EVERY 5 MIN PRN
Status: DISCONTINUED | OUTPATIENT
Start: 2025-01-08 | End: 2025-01-08 | Stop reason: HOSPADM

## 2025-01-08 RX ORDER — MORPHINE SULFATE 15 MG/1
15 TABLET, FILM COATED, EXTENDED RELEASE ORAL EVERY 12 HOURS SCHEDULED
Status: DISCONTINUED | OUTPATIENT
Start: 2025-01-08 | End: 2025-01-09

## 2025-01-08 RX ORDER — HYDROMORPHONE HYDROCHLORIDE 1 MG/ML
0.4 INJECTION, SOLUTION INTRAMUSCULAR; INTRAVENOUS; SUBCUTANEOUS EVERY 5 MIN PRN
Status: DISCONTINUED | OUTPATIENT
Start: 2025-01-08 | End: 2025-01-08 | Stop reason: HOSPADM

## 2025-01-08 RX ORDER — SODIUM PHOSPHATE, DIBASIC AND SODIUM PHOSPHATE, MONOBASIC 7; 19 G/230ML; G/230ML
1 ENEMA RECTAL ONCE AS NEEDED
Status: DISCONTINUED | OUTPATIENT
Start: 2025-01-08 | End: 2025-01-09

## 2025-01-08 RX ORDER — TRANEXAMIC ACID 10 MG/ML
INJECTION, SOLUTION INTRAVENOUS AS NEEDED
Status: DISCONTINUED | OUTPATIENT
Start: 2025-01-08 | End: 2025-01-08 | Stop reason: SURG

## 2025-01-08 RX ORDER — MORPHINE SULFATE 10 MG/ML
6 INJECTION, SOLUTION INTRAMUSCULAR; INTRAVENOUS EVERY 10 MIN PRN
Status: DISCONTINUED | OUTPATIENT
Start: 2025-01-08 | End: 2025-01-08 | Stop reason: HOSPADM

## 2025-01-08 RX ORDER — HYDROCODONE BITARTRATE AND ACETAMINOPHEN 10; 325 MG/1; MG/1
2 TABLET ORAL EVERY 6 HOURS PRN
Status: DISCONTINUED | OUTPATIENT
Start: 2025-01-08 | End: 2025-01-09

## 2025-01-08 RX ADMIN — PHENYLEPHRINE HCL 100 MCG: 10 MG/ML VIAL (ML) INJECTION at 12:43:00

## 2025-01-08 RX ADMIN — PHENYLEPHRINE HCL 50 MCG: 10 MG/ML VIAL (ML) INJECTION at 13:04:00

## 2025-01-08 RX ADMIN — ROCURONIUM BROMIDE 20 MG: 10 INJECTION, SOLUTION INTRAVENOUS at 12:50:00

## 2025-01-08 RX ADMIN — TRANEXAMIC ACID 1000 MG: 10 INJECTION, SOLUTION INTRAVENOUS at 12:20:00

## 2025-01-08 RX ADMIN — ROCURONIUM BROMIDE 50 MG: 10 INJECTION, SOLUTION INTRAVENOUS at 12:03:00

## 2025-01-08 RX ADMIN — PHENYLEPHRINE HCL 100 MCG: 10 MG/ML VIAL (ML) INJECTION at 13:10:00

## 2025-01-08 RX ADMIN — EPHEDRINE SULFATE 5 MG: 50 INJECTION, SOLUTION INTRAVENOUS at 13:29:00

## 2025-01-08 RX ADMIN — DEXAMETHASONE SODIUM PHOSPHATE 10 MG: 10 INJECTION, SOLUTION INTRAMUSCULAR; INTRAVENOUS at 11:47:00

## 2025-01-08 RX ADMIN — MIDAZOLAM HYDROCHLORIDE 2 MG: 1 INJECTION INTRAMUSCULAR; INTRAVENOUS at 11:47:00

## 2025-01-08 RX ADMIN — ROPIVACAINE HYDROCHLORIDE 30 ML: 5 INJECTION, SOLUTION EPIDURAL; INFILTRATION; PERINEURAL at 11:47:00

## 2025-01-08 RX ADMIN — EPHEDRINE SULFATE 5 MG: 50 INJECTION, SOLUTION INTRAVENOUS at 13:39:00

## 2025-01-08 RX ADMIN — SODIUM CHLORIDE, SODIUM LACTATE, POTASSIUM CHLORIDE, CALCIUM CHLORIDE: 600; 310; 30; 20 INJECTION, SOLUTION INTRAVENOUS at 13:11:00

## 2025-01-08 RX ADMIN — SODIUM CHLORIDE, SODIUM LACTATE, POTASSIUM CHLORIDE, CALCIUM CHLORIDE: 600; 310; 30; 20 INJECTION, SOLUTION INTRAVENOUS at 11:58:00

## 2025-01-08 RX ADMIN — PHENYLEPHRINE HCL 100 MCG: 10 MG/ML VIAL (ML) INJECTION at 12:56:00

## 2025-01-08 RX ADMIN — SODIUM CHLORIDE, SODIUM LACTATE, POTASSIUM CHLORIDE, CALCIUM CHLORIDE: 600; 310; 30; 20 INJECTION, SOLUTION INTRAVENOUS at 14:17:00

## 2025-01-08 RX ADMIN — EPHEDRINE SULFATE 5 MG: 50 INJECTION, SOLUTION INTRAVENOUS at 13:23:00

## 2025-01-08 RX ADMIN — TRANEXAMIC ACID 1000 MG: 10 INJECTION, SOLUTION INTRAVENOUS at 13:41:00

## 2025-01-08 RX ADMIN — PHENYLEPHRINE HCL 100 MCG: 10 MG/ML VIAL (ML) INJECTION at 13:31:00

## 2025-01-08 RX ADMIN — LIDOCAINE HYDROCHLORIDE 50 MG: 10 INJECTION, SOLUTION EPIDURAL; INFILTRATION; INTRACAUDAL; PERINEURAL at 12:02:00

## 2025-01-08 RX ADMIN — ONDANSETRON 4 MG: 2 INJECTION INTRAMUSCULAR; INTRAVENOUS at 13:39:00

## 2025-01-08 RX ADMIN — SODIUM CHLORIDE, SODIUM LACTATE, POTASSIUM CHLORIDE, CALCIUM CHLORIDE: 600; 310; 30; 20 INJECTION, SOLUTION INTRAVENOUS at 13:51:00

## 2025-01-08 RX ADMIN — NEOSTIGMINE METHYLSULFATE 5 MG: 1 INJECTION INTRAVENOUS at 13:40:00

## 2025-01-08 RX ADMIN — GLYCOPYRROLATE 1 MG: 0.2 INJECTION, SOLUTION INTRAMUSCULAR; INTRAVENOUS at 13:40:00

## 2025-01-08 RX ADMIN — PHENYLEPHRINE HCL 100 MCG: 10 MG/ML VIAL (ML) INJECTION at 13:47:00

## 2025-01-08 RX ADMIN — DEXAMETHASONE SODIUM PHOSPHATE 4 MG: 4 MG/ML VIAL (ML) INJECTION at 13:04:00

## 2025-01-08 RX ADMIN — PHENYLEPHRINE HCL 50 MCG: 10 MG/ML VIAL (ML) INJECTION at 12:37:00

## 2025-01-08 NOTE — INTERVAL H&P NOTE
Pre-op Diagnosis: Osteoarthritis    The above referenced H&P was reviewed by RONNIE CMCLENDON PA on 1/8/2025, the patient was examined and no significant changes have occurred in the patient's condition since the H&P was performed.  I discussed with the patient and/or legal representative the potential benefits, risks and side effects of this procedure; the likelihood of the patient achieving goals; and potential problems that might occur during recuperation.  I discussed reasonable alternatives to the procedure, including risks, benefits and side effects related to the alternatives and risks related to not receiving this procedure.  We will proceed with procedure as planned.

## 2025-01-08 NOTE — ANESTHESIA POSTPROCEDURE EVALUATION
Patient: Jorge Dumont    Procedure Summary       Date: 01/08/25 Room / Location: [facility] MAIN OR  / [facility] MAIN OR    Anesthesia Start: 1157 Anesthesia Stop: 1420    Procedure: Right shoulder anatomic,  total shoulder arthroplasty (Right: Shoulder) Diagnosis: (Osteoarthritis)    Surgeons: Bebo Hopson MD Anesthesiologist: Lissette Cruz MD    Anesthesia Type: general, regional ASA Status: 2            Anesthesia Type: general, regional    Vitals Value Taken Time   /85 01/08/25 1419   Temp 97.5 °F (36.4 °C) 01/08/25 1419   Pulse 71 01/08/25 1420   Resp 12 01/08/25 1420   SpO2 95 % 01/08/25 1420   Vitals shown include unfiled device data.    [facility] AN Post Evaluation:   Patient Evaluated in PACU  Patient Participation: complete - patient participated  Level of Consciousness: awake and alert  Pain Score: 0  Pain Management: adequate  Airway Patency:patent  Dental exam unchanged from preop  Yes    Nausea/Vomiting: none  Cardiovascular Status: acceptable and hemodynamically stable  Respiratory Status: acceptable, nonlabored ventilation, spontaneous ventilation and nasal cannula  Postoperative Hydration acceptable  Comments: Resting comfortably in PACU, awake and asking questions about surgery.   Pre-op interscalene block working well, denies right shoulder pain. Right arm in sling.   No current anesthetic concerns.       Lissette Cruz MD  1/8/2025 2:20 PM

## 2025-01-08 NOTE — ANESTHESIA PREPROCEDURE EVALUATION
Anesthesia PreOp Note    HPI:     Jorge Dumont is a 59 year old male who presents for preoperative consultation requested by: Bebo Hopson MD    Date of Surgery: 1/8/2025    Procedure(s):  Right shoulder anatomic, possible reverse total shoulder arthroplasty  Indication: Osteoarthritis    Relevant Problems   No relevant active problems       NPO:  Last Liquid Consumption Date: 01/07/25  Last Liquid Consumption Time: 2000  Last Solid Consumption Date: 01/07/25  Last Solid Consumption Time: 2000  Last Liquid Consumption Date: 01/07/25          History Review:  Patient Active Problem List    Diagnosis Date Noted    Right wrist pain 01/26/2019    C2-3 mild-mod central, C3-4 mild-mod diffuse & right mod foraminal, C4-5 mild diffuse & left mild-mod foraminal, C5-6 mod diffuse, C7-T1 mod diffuse bulging discs 01/26/2019    C5-6 right mod, C6-7 mod central & left herniated discs 01/26/2019    C3-4 right mod-severe foraminal & mild-mod central, C4-5 left mild foraminal, C5-6 mod-severe central & right mod-severe/left mod foraminal, C6-7 & C7-T1 mod-severe central stenosis 01/26/2019    L5-S1 left profound/right severe, L3-4/L4-5 left mod foraminal stenosis 12/03/2018    Ulnar neuropathy of right upper extremity 04/18/2018    right supraspinatus moderate-large articular and bursal surface tears with moderate full thickness, Infraspintus 90% full thicknees tear, Subscapularis large articular surface tear 04/18/2018    Baker cyst, right 08/22/2017    Leg DVT (deep venous thromboembolism), acute, right (HCC) 08/22/2017    right shoulder severe degenerative arthritis 03/27/2017    Adhesive capsulitis of right shoulder 03/27/2017    Biceps tendonitis on right 03/27/2017    right C6 and C8 radiculopathies 03/27/2017    History of total hip arthroplasty, right 05/13/2016    right hip severe OA and s/p GEORGE 02/06/2016    Right hip pain 01/26/2016    Right low back pain 01/26/2016    Left low back pain     Right shoulder pain      right > left L5-S1 radiculopathy 06/26/2014    L5-S1 grade 2-3 spondylolisthesis 06/26/2014    L1-2 grade 1 retrolisthesis 06/26/2014    L4-5 left mod/right mild foraminal, L3-4 bilateral mod foraminal, L1-2 mild-mod diffuse, L5-S1 left > right mod diffuse bulging discs 06/26/2014    Degeneration of lumbar or lumbosacral intervertebral disc 04/22/2013       Past Medical History:    Calculus of kidney    DVT (deep venous thrombosis) ([facility])    RLE; popliteal vein    Injury of musculoskeletal system    per [employer]:  \"Bicep and tendon tear\"    Lower back pain    Osteoarthritis    Osteonecrosis of femur due to previous trauma, right ([facility])    Visual impairment    wears eyeglasses       Past Surgical History:   Procedure Laterality Date    Anesth,surgery of shoulder  2007    Back surgery  6/2014    cortisone injection    Colonoscopy N/A 12/14/2021    Procedure: COLONOSCOPY;  Surgeon: John Davalos MD;  Location: [facility] ENDOSCOPY     pr closed tx post hip arthroplasty disloc no anesth Right 05/09/2016    Musculoskeletal surgery unlisted      per [employer]:  \"repair (of bicep and tendon tear)       Prescriptions Prior to Admission[1]  Current Medications and Prescriptions Ordered in Epic[2]    Allergies[3]    Family History   Problem Relation Age of Onset    Diabetes Father     Colon Cancer Father     Diabetes Mother     Heart Disease Mother     Hypertension Mother     Heart Disease Other         Family h/o CAD    Diabetes Other         Family h/o Diabetes    Seizure Disorder Daughter     Alcohol and Other Disorders Associated Brother         Alcoholism    Other (Other) Brother         Drug abuse    Asthma Other         Nephew    Bleeding Disorders Neg     Anemia Neg     Clotting Disorder Neg      Social History     Socioeconomic History    Marital status:    Tobacco Use    Smoking status: Every Day     Types: Cigars    Smokeless tobacco: Former     Types: Chew    Tobacco comments:     1 cigar in a few days.   quit chewing tobacco in 2010; former 20 yrs of use   Vaping Use    Vaping status: Never Used   Substance and Sexual Activity    Alcohol use: Yes     Alcohol/week: 0.0 standard drinks of alcohol     Comment: socially    Drug use: No   Other Topics Concern    Caffeine Concern Yes     Comment: 2 cups coffee daily    Sleep Concern Yes    Exercise Yes     Comment: walking daily    Right Handed Yes       Available pre-op labs reviewed.             Vital Signs:  Body mass index is 27.6 kg/m².   height is 1.676 m (5' 6\") and weight is 77.6 kg (171 lb). His oral temperature is 97.7 °F (36.5 °C). His blood pressure is 126/84 and his pulse is 65. His respiration is 18 and oxygen saturation is 96%.   Vitals:    01/02/25 1425 01/08/25 1027   BP:  126/84   Pulse:  65   Resp:  18   Temp:  97.7 °F (36.5 °C)   TempSrc:  Oral   SpO2:  96%   Weight: 79.4 kg (175 lb) 77.6 kg (171 lb)   Height: 1.676 m (5' 6\") 1.676 m (5' 6\")        Anesthesia Evaluation     Patient summary reviewed and Nursing notes reviewed    No history of anesthetic complications   Airway   Mallampati: II  TM distance: >3 FB  Neck ROM: full  Dental      Comment: + poor dentition, cracked and discolored teeth      Pulmonary - normal exam     ROS comment: Currently smokes 1/2 cigar per day, has been doing this for the last 15 years.   No current SOB, but state he does have post nasal drip.   Does not use inhalers  Denies marijuana or cigarette use  Cardiovascular - normal exam  Exercise tolerance: good    ROS comment: No recent chest pain    Neuro/Psych    (+)  neuromuscular disease (cervical and lumbar radiculopathy),        Comments: Good ROM in neck, says neck issues due to his shoulder and lumbar issues due to his hip.   Denies neuropathy or weakness in right arm, does have LE neuropathy and tingling in left arm    GI/Hepatic/Renal - negative ROS     Endo/Other - negative ROS   Abdominal  - normal exam                 Anesthesia Plan:   ASA:  2  Plan:   General  and regional  Monitors and Lines:   BIS  Airway:  ETT and Video laryngoscope  Post-op Pain Management: IV analgesics and Interscalene block  Plan Comments: Risk of secondary nerve injury with nerve block explained if previous nerve injury exists. Jorge states he does not have any weakness or numbness in the right arm, would like to proceed with interscalene block.   Informed Consent Plan and Risks Discussed With:  Patient  Use of Blood Products Discussed With:  Patient  Blood Product Use Consented    Discussed plan with:  Surgeon      I have informed Jorge Dumont and/or legal guardian or family member of the nature of the anesthetic plan, benefits, risks including possible dental damage if relevant, major complications, and any alternative forms of anesthetic management.   All of the patient's questions were answered to the best of my ability. The patient desires the anesthetic management as planned.  Lissette Cruz MD  2025 10:50 AM  Present on Admission:  **None**           [1]   Medications Prior to Admission   Medication Sig Dispense Refill Last Dose/Taking    Ibuprofen (ADVIL) 200 MG Oral Cap Take 1 capsule (200 mg total) by mouth as needed.   2024    acetaminophen 500 MG Oral Tab Take 1 tablet (500 mg total) by mouth every 6 (six) hours as needed for Pain.   2025 at  5:00 AM    amoxicillin 500 MG Oral Cap Take 1 capsule (500 mg total) by mouth As Directed. For dental procedure       [] PEG 3350-KCl-NaBcb-NaCl-NaSulf (PEG-3350/ELECTROLYTES) 236 g Oral Recon Soln Take 4 L by mouth one time for 1 dose. (Patient taking differently: Take 4 L by mouth one time. Prep for colonoscopy for 2025) 1 each 0    [2]   Current Facility-Administered Medications Ordered in Epic   Medication Dose Route Frequency Provider Last Rate Last Admin    lactated ringers infusion   Intravenous Continuous Bebo Hopson MD 20 mL/hr at 25 1033 New Bag at 25 1033    ceFAZolin (Ancef) 2g in 10mL IV  syringe premix  2 g Intravenous Once Margarita Jackson PA        vancomycin (Vancocin) 1.25 g in sodium chloride 0.9% 250mL IVPB premix  15 mg/kg Intravenous Once Margarita Jackson PA         No current [facility]-ordered outpatient medications on file.   [3] No Known Allergies

## 2025-01-08 NOTE — BRIEF OP NOTE
Pre-Operative Diagnosis: Osteoarthritis     Post-Operative Diagnosis: Osteoarthritis      Procedure Performed:   Right shoulder anatomic,  total shoulder arthroplasty    Surgeons and Role:     * Bebo Hopson MD - Primary    Assistant(s):  Surgical Assistant.: John Winters MD    Surgical Findings: same     Specimen:none     Estimated Blood Loss: Blood Output: 150 mL (1/8/2025  1:20 PM)    RONNIE MCCLENDON PA  1/8/2025  2:05 PM

## 2025-01-08 NOTE — ANESTHESIA PROCEDURE NOTES
Airway  Date/Time: 1/8/2025 12:06 PM  Urgency: Elective    Airway not difficult    General Information and Staff    Patient location during procedure: OR  Anesthesiologist: Lissette Cruz MD  Performed: anesthesiologist   Performed by: Lissette Cruz MD  Authorized by: Lissette Cruz MD      Indications and Patient Condition  Indications for airway management: anesthesia  Spontaneous Ventilation: absent  Sedation level: deep  Preoxygenated: yes  Patient position: sniffing  Mask difficulty assessment: 1 - vent by mask  Planned trial extubation    Final Airway Details  Final airway type: endotracheal airway      Successful airway: ETT  Cuffed: yes   Successful intubation technique: Video laryngoscopy  Facilitating devices/methods: cricoid pressure and intubating stylet  Endotracheal tube insertion site: oral  Blade type: Santo.  Blade size: #3  ETT size (mm): 7.5    Cormack-Lehane Classification: grade I - full view of glottis  Placement verified by: capnometry   Measured from: lips  ETT to lips (cm): 23  Number of attempts at approach: 1  Number of other approaches attempted: 0    Additional Comments  Intubated easily on first attempt, no dental or soft tissue damage. No signs of aspiration.   Neck kept in neutral position throughout

## 2025-01-08 NOTE — ANESTHESIA PROCEDURE NOTES
Peripheral Block    Date/Time: 1/8/2025 11:47 AM    Performed by: Lissette Cruz MD  Authorized by: Lissette Cruz MD      General Information and Staff    Start Time:  1/8/2025 11:47 AM  End Time:  1/8/2025 11:51 AM  Anesthesiologist:  Lissette Cruz MD  Performed by:  Anesthesiologist  Patient Location:  PACU    Block Placement: Pre Induction  Site Identification: real time ultrasound guided, static ultrasound guided, nerve stimulator and image stored and retrievable    Block site/laterality marked before start: site marked  Reason for Block: at surgeon's request and post-op pain management    Preanesthetic Checklist: 2 patient identifers, IV checked, site marked, risks and benefits discussed, monitors and equipment checked, pre-op evaluation, timeout performed, anesthesia consent, sterile technique used, no prohibitive neurological deficits and no local skin infection at insertion site      Procedure Details    Patient Position:  Sitting  Prep: ChloraPrep and patient draped    Monitoring:  Continuous pulse ox and blood pressure cuff  Block Type:  Interscalene  Laterality:  Right  Injection Technique:  Single-shot    Needle    Needle Type:  Short-bevel  Needle Gauge:  22 G  Needle Length:  25 mm  Needle Localization:  Ultrasound guidance and nerve stimulator  Reason for Ultrasound Use: appropriate spread of the medication was noted in real time and no ultrasound evidence of intravascular and/or intraneural injection    Nerve Stimulator: 1 amps  Muscle Twitch Response: digit or wrist extension      Assessment    Injection Assessment:  Good spread noted, incremental injection, local visualized surrounding nerve on ultrasound, low pressure, negative aspiration for heme, no pain on injection and negative resistance  Paresthesia Pain:  Immediately resolved  Heart Rate Change: No    - Patient tolerated block procedure well without evidence of immediate block related complications.     Medications  1/8/2025  11:47 AM  midazolam (VERSED)injection 2mg/2ml - Intravenous   2 mg - 1/8/2025 11:47:00 AM  ropivacaine (NAROPIN) injection 0.5% - Infiltration   30 mL - 1/8/2025 11:47:00 AM  dexamethasone (DECADRON) PF injection 10 mg/ml - Injection   10 mg - 1/8/2025 11:47:00 AM    Additional Comments    Local anesthesia injected in divided doses. Ultrasound images obtained before and after injection of local anesthetic.   Jorge awake and alert during procedure. One small paresthesia at end of block, resolved immediately.

## 2025-01-08 NOTE — CONSULTS
[facility]    PATIENT'S NAME: RAPHAEL ZUNIGA   ATTENDING PHYSICIAN: Bebo Hopson MD   CONSULTING PHYSICIAN: Tabitha Bates MD   PATIENT ACCOUNT#:   [id number]    LOCATION:  [facility] PACU 10 [facility] 10  MEDICAL RECORD #:   [id number]       YOB: 1965  ADMISSION DATE:       01/08/2025      CONSULT DATE:  01/08/2025    REPORT OF CONSULTATION    REASON FOR ADMISSION:  Post right shoulder anatomic arthroplasty.    HISTORY OF PRESENT ILLNESS:  Patient is a 59-year-old  male with chronic right shoulder pain and underlying severe primary osteoarthritis, failed outpatient conservative medical management options and scheduled today for above-mentioned procedure by his orthopedic surgeon, Dr. Hopson.  Preoperatively, he had interscalene block.  Postoperatively, transferred to PACU for further monitoring.    PAST MEDICAL HISTORY:  Generalized osteoarthritis, degenerative joint disease of cervical and lumbar spine, kidney stones, and DVT.    PAST SURGICAL HISTORY:  Right total hip arthroplasty, cystoscopy, multiple colonoscopies and polypectomies, lumbar epidural steroid injections.    MEDICATIONS:  Please see medication reconciliation list.     ALLERGIES:  No known drug allergies.     SOCIAL HISTORY:  Chronic tobacco use.  Social alcohol.  No drug use.  Independent in his basic activities of daily living.     FAMILY HISTORY:  Mother had diabetes mellitus type 2 and heart disease.  Father had diabetes mellitus type 2 and colon cancer.    REVIEW OF SYSTEMS:  Currently resting in bed.  No right shoulder pain.  No chest pain.  No shortness of breath.  Other 12-point review of systems is negative.       PHYSICAL EXAMINATION:    GENERAL:  Alert and oriented to time, place and person.  No acute distress.   VITAL SIGNS:  Temperature 97.5, pulse 76, respiratory rate 21, blood pressure 114/85, pulse ox 94% on room air.  HEENT:  Atraumatic.  Oropharynx clear.  Moist mucous membranes.  Ears and nose  normal.  Eyes:  Anicteric sclerae.   NECK:  Supple.  No lymphadenopathy.  Trachea midline.  Full range of motion.   LUNGS:  Clear to auscultation bilaterally.  Normal respiratory effort.    HEART:  Regular rate and rhythm.  S1 and S2 auscultated.  No murmur.    ABDOMEN:  Soft, nondistended.  No tenderness.  Positive bowel sounds.   EXTREMITIES:  Right shoulder dressing, sling immobilizer.   NEUROLOGIC:  Decreased sensation and muscle movement in right upper extremity, post interscalene block.  No other focal findings.    ASSESSMENT AND PLAN:  Right shoulder primary osteoarthritis, status post right shoulder anatomic total shoulder arthroplasty.  Interscalene block.  Pain control.  Neurovascular checks.  DVT prophylaxis.  Physical and occupational therapy.      Dictated By Tabitha Bates MD  d: 01/08/2025 14:30:53  t: 01/08/2025 14:43:14  Job [id number]  FB/

## 2025-01-09 VITALS
HEIGHT: 66 IN | OXYGEN SATURATION: 96 % | WEIGHT: 171 LBS | DIASTOLIC BLOOD PRESSURE: 90 MMHG | BODY MASS INDEX: 27.48 KG/M2 | HEART RATE: 77 BPM | RESPIRATION RATE: 18 BRPM | TEMPERATURE: 98 F | SYSTOLIC BLOOD PRESSURE: 133 MMHG

## 2025-01-09 PROCEDURE — 99214 OFFICE O/P EST MOD 30 MIN: CPT | Performed by: HOSPITALIST

## 2025-01-09 NOTE — DISCHARGE SUMMARY
[facility] Hospitalist Discharge Summary   Patient ID:  Jorge Dumont  [id number]  59 year old  5/3/1965    Admit date: 1/8/2025  Discharge date: 1/9/2025  Primary Care Physician: Roddy Meredith DO   Attending Physician: Jared Johnson MD   Consults:   Consultants         Provider   Role Specialty     Tabitha Bates MD      Consulting Physician HOSPITALIST            Discharge Diagnoses:   Primary osteoarthritis of right shoulder    Reason for admission  Copied from admission H&P: please refer to preop H&P     Hospital Course:     R shoulder primary osteoarthritis  - s/p R shoulder anatomic total shoulder arthroplasty 1/8/24 by Dr Hopson.   - Pain controlled.   - DVT prevention per Dr Hopson.   - DC pain meds and DVT prevention meds per surgical team.   - postop XR pending.     Medically stable for DC home today if cleared OT and if ok with surgery     EXAM:   GENERAL: no apparent distress, comfortable  NEURO: A/A Ox3, no focal deficits  RESP: non labored, CTAB/L  CARDIO: Regular, no murmur  ABD: soft, NT, ND  EXTREMITIES: no edema, no calf tenderness    Operative Procedures: Procedure(s) (LRB):  Right shoulder anatomic,  total shoulder arthroplasty (Right)    Discharge Instructions     Medication List        CONTINUE taking these medications      acetaminophen 500 MG Tabs  Commonly known as: Tylenol Extra Strength     Advil 200 MG Caps  Generic drug: Ibuprofen     amoxicillin 500 MG Caps  Commonly known as: Amoxil            STOP taking these medications      PEG-3350/Electrolytes 236 g Solr              Activity: activity as tolerated  Diet: regular diet  Wound Care: NA  Code Status: No Order          Important follow up:   Follow-up Information       Roddy Meredith DO Follow up in 1 week(s).    Specialty: Family Medicine  Contact information:  [address] [place] [id number] [phone]                             -PCP in [] within 7 days [] within 14 days [] other     Disposition:  home  Discharged Condition: good    Hospital Discharge Diagnoses:  Shoulder OA    Lace+ Score: 16  59-90 High Risk  29-58 Medium Risk  0-28   Low Risk.    TCM Follow-Up Recommendation:  LACE < 29: Low Risk of readmission after discharge from the hospital. No TCM follow-up needed.            Total Time Coordinating Care: Greater than 30 minutes    Patient had opportunity to ask questions, state understanding, and agree with therapeutic plan as outlined    Jared Johnson MD  Hospitalist  1/9/2025

## 2025-01-09 NOTE — ANESTHESIA POST-OP FOLLOW-UP NOTE
[facility]   Acute Pain Rounds Note  2025    Patient name: Jorge Dumont 59 year old male  : 5/3/1965  MRN: [id number]      S/P IS Block D#1    Current hospital day: Hospital Day: 2    Pain Scores: 1    Current Medications:  Scheduled Meds:   sennosides  17.2 mg Oral Nightly    docusate sodium  100 mg Oral BID    morphINE ER  15 mg Oral 2 times per day     Continuous Infusions:   lactated ringers Stopped (25 1800)     PRN Meds:.  morphINE **OR** morphINE **OR** morphINE    oxyCODONE **OR** oxyCODONE **OR** morphINE    sodium chloride    polyethylene glycol (PEG 3350)    magnesium hydroxide    bisacodyl    fleet enema    ondansetron    prochlorperazine    HYDROcodone-acetaminophen **OR** HYDROcodone-acetaminophen    PE: AAOX3. Moving extremities. Sensation is back..    Assessment:  Pain controlled with pain meds.    Plan:  CPM    Total Visit Time 9 min    JACK KENNY MD  Anesthesia Acute Pain Service [phone]

## 2025-01-09 NOTE — PLAN OF CARE
Problem: Patient Centered Care  Goal: Patient preferences are identified and integrated in the patient's plan of care  Description: Interventions:  - What would you like us to know as we care for you? I live at home with my spouse.  - Provide timely, complete, and accurate information to patient/family  - Incorporate patient and family knowledge, values, beliefs, and cultural backgrounds into the planning and delivery of care  - Encourage patient/family to participate in care and decision-making at the level they choose  - Honor patient and family perspectives and choices  Outcome: Progressing     Problem: Patient/Family Goals  Goal: Patient/Family Long Term Goal  Description: Patient's Long Term Goal: no more pain    Interventions:  - PRN pain medication  - See additional Care Plan goals for specific interventions  Outcome: Progressing  Goal: Patient/Family Short Term Goal  Description: Patient's Short Term Goal: go home    Interventions:   - Follow plan of care  - See additional Care Plan goals for specific interventions  Outcome: Progressing     Problem: PAIN - ADULT  Goal: Verbalizes/displays adequate comfort level or patient's stated pain goal  Description: INTERVENTIONS:  - Encourage pt to monitor pain and request assistance  - Assess pain using appropriate pain scale  - Administer analgesics based on type and severity of pain and evaluate response  - Implement non-pharmacological measures as appropriate and evaluate response  - Consider cultural and social influences on pain and pain management  - Manage/alleviate anxiety  - Utilize distraction and/or relaxation techniques  - Monitor for opioid side effects  - Notify MD/LIP if interventions unsuccessful or patient reports new pain  - Anticipate increased pain with activity and pre-medicate as appropriate  Outcome: Progressing     Problem: SAFETY ADULT - FALL  Goal: Free from fall injury  Description: INTERVENTIONS:  - Assess pt frequently for physical  needs  - Identify cognitive and physical deficits and behaviors that affect risk of falls.  - [place] fall precautions as indicated by assessment.  - Educate pt/family on patient safety including physical limitations  - Instruct pt to call for assistance with activity based on assessment  - Modify environment to reduce risk of injury  - Provide assistive devices as appropriate  - Consider OT/PT consult to assist with strengthening/mobility  - Encourage toileting schedule  Outcome: Progressing     Problem: DISCHARGE PLANNING  Goal: Discharge to home or other facility with appropriate resources  Description: INTERVENTIONS:  - Identify barriers to discharge w/pt and caregiver  - Include patient/family/discharge partner in discharge planning  - Arrange for needed discharge resources and transportation as appropriate  - Identify discharge learning needs (meds, wound care, etc)  - Arrange for interpreters to assist at discharge as needed  - Consider post-discharge preferences of patient/family/discharge partner  - Complete POLST form as appropriate  - Assess patient's ability to be responsible for managing their own health  - Refer to Case Management Department for coordinating discharge planning if the patient needs post-hospital services based on physician/LIP order or complex needs related to functional status, cognitive ability or social support system  Outcome: Progressing

## 2025-01-09 NOTE — OPERATIVE REPORT
[facility]    PATIENT'S NAME: RAPHAEL ZUNIGA   ATTENDING PHYSICIAN: Bebo Hopson MD   OPERATING PHYSICIAN: Bebo Hopson MD   PATIENT ACCOUNT#:   [id number]    LOCATION:  1E Room 1 A [facility]  MEDICAL RECORD #:   [id number]       YOB: 1965  ADMISSION DATE:       01/08/2025      OPERATION DATE:  01/08/2025    OPERATIVE REPORT      PREOPERATIVE DIAGNOSIS:    1.   Right shoulder osteoarthritis.  2.   Right biceps tear.  POSTOPERATIVE DIAGNOSIS:    1.   Right shoulder osteoarthritis.  2.   Right biceps tear.  PROCEDURE:    1.   Right anatomic total shoulder arthroplasty.  2.   Right biceps tenodesis.    INDICATIONS:  This is a 59-year-old male with a history of a painful shoulder.     OPERATIVE TECHNIQUE:  An extended deltopectoral incision was utilized to identify the cephalic vein which was retracted laterally with the deltoid exposing the underlying clavipectoral fascia.  This was incised lateral to the conjoined tendon, identified the axillary nerve and musculocutaneous nerve for protection throughout the procedure.    The bicipital sheath was opened showing a markedly torn biceps tendon, although it was in continuity.  Based on this pathology, we proceeded with a biceps tenodesis.  Two #2 Ethibonds were used to placed a locking stitches in the biceps tendon which was then tenodesed to the upper portion of the pectoralis major tendon.  The proximal portion of the biceps was then tenotomized.  This was dissected up through the rotator interval.  A subscapularis tenotomy was brought down through an inferior capsular release.  There were a number of very large osteophytes which were carefully removed circumferentially around the proximal humerus.  Multiple loose bodies were present which were then likewise removed.  The proximal humerus was then reamed and an osteotomy was made at the rotator cuff insertion.  The proximal humerus was then broached, and the head was trialed to the appropriate head  size.  Humeral head protector was placed.  The shoulder was subluxed posterior to the glenoid.  A 360-degree release of the glenoid was performed, and a 360-degree release of the subscapularis was performed.  There were numerous loose bodies present in the posterior capsule and inferior capsule which were likewise removed.  A PCI guide was used to place guide pins in the glenoid.  The glenoid was reamed for an augmented 5 Dell glenoid which was then cemented and inserted.  The shoulder was lavaged.  The final humeral components were brought up on the field and inserted.  The shoulder was reduced and brought through a range of motion and was stable.  The subscapularis tendon was then repaired using #2 Ethibonds.  The shoulder was lavaged and then closed in sequential layered fashion.  Intraoperative x-rays showed excellent position of replacement.  There were no intraoperative complications.  All sponge and lap counts were correct at the end of the procedure.    Dictated By Bebo Hopson MD  d: 01/08/2025 17:43:34  t: 01/09/2025 03:06:01  [facility] [id number]  /

## 2025-04-11 ENCOUNTER — ANESTHESIA (OUTPATIENT)
Dept: ENDOSCOPY | Facility: HOSPITAL | Age: 60
End: 2025-04-11
Payer: COMMERCIAL

## 2025-04-11 ENCOUNTER — HOSPITAL ENCOUNTER (OUTPATIENT)
Facility: HOSPITAL | Age: 60
Setting detail: HOSPITAL OUTPATIENT SURGERY
Discharge: HOME | End: 2025-04-11
Attending: INTERNAL MEDICINE | Admitting: INTERNAL MEDICINE
Payer: COMMERCIAL

## 2025-04-11 ENCOUNTER — ANESTHESIA EVENT (OUTPATIENT)
Dept: ENDOSCOPY | Facility: HOSPITAL | Age: 60
End: 2025-04-11
Payer: COMMERCIAL

## 2025-04-11 VITALS
SYSTOLIC BLOOD PRESSURE: 112 MMHG | OXYGEN SATURATION: 97 % | RESPIRATION RATE: 13 BRPM | BODY MASS INDEX: 27.32 KG/M2 | WEIGHT: 170 LBS | HEART RATE: 68 BPM | DIASTOLIC BLOOD PRESSURE: 93 MMHG | HEIGHT: 66 IN

## 2025-04-11 DIAGNOSIS — Z80.0 FAMILY HISTORY OF COLON CANCER: ICD-10-CM

## 2025-04-11 DIAGNOSIS — Z86.0100 HX OF COLONIC POLYP: ICD-10-CM

## 2025-04-11 PROBLEM — K64.8 INTERNAL HEMORRHOIDS: Status: ACTIVE | Noted: 2025-04-11

## 2025-04-11 PROCEDURE — 45385 COLONOSCOPY W/LESION REMOVAL: CPT | Performed by: INTERNAL MEDICINE

## 2025-04-11 PROCEDURE — 0DBL8ZX EXCISION OF TRANSVERSE COLON, VIA NATURAL OR ARTIFICIAL OPENING ENDOSCOPIC, DIAGNOSTIC: ICD-10-PCS | Performed by: INTERNAL MEDICINE

## 2025-04-11 RX ORDER — NALOXONE HYDROCHLORIDE 0.4 MG/ML
0.08 INJECTION, SOLUTION INTRAMUSCULAR; INTRAVENOUS; SUBCUTANEOUS ONCE AS NEEDED
Status: DISCONTINUED | OUTPATIENT
Start: 2025-04-11 | End: 2025-04-11

## 2025-04-11 RX ORDER — SODIUM CHLORIDE, SODIUM LACTATE, POTASSIUM CHLORIDE, CALCIUM CHLORIDE 600; 310; 30; 20 MG/100ML; MG/100ML; MG/100ML; MG/100ML
INJECTION, SOLUTION INTRAVENOUS CONTINUOUS
Status: DISCONTINUED | OUTPATIENT
Start: 2025-04-11 | End: 2025-04-11

## 2025-04-11 RX ORDER — ONDANSETRON 2 MG/ML
4 INJECTION INTRAMUSCULAR; INTRAVENOUS ONCE AS NEEDED
Status: DISCONTINUED | OUTPATIENT
Start: 2025-04-11 | End: 2025-04-11

## 2025-04-11 NOTE — DISCHARGE INSTRUCTIONS
.  .  .  Notes from Dr Davalos:  One small benign colon polyps were found and removed today - to be sent to the lab to be examined - a letter will be sent with results.  You may see small quantities of blood with your next 1-2 bowel movements today.    If you check your results on [employer], the \"pathology\" report on the colon polyp(s) should be released within the next 24-48 hours.  In general, a \"hyperplastic\" colon polyp is completely benign, vs an \"adenoma\" or \"sessile serrated adenoma\" which is considered a benign but precancerous colon polyp.  That will be explained in a [employer] message from me as well.  No aspirin, Excedrin, Advil/Motrin/ibuprofen, Aleve/naproxen for next 5 days (to prevent bleeding.)  Internal hemorrhoids - very common  If you are raw and irritated back there after all of that diarrhea and wiping, three good options to soothe and heal the irritation include Aquaphor ointment, \"Desitin\" or \"A & D\" diaper ointment, or good old-fashioned Vaseline.  All of these soothe and create a protective barrier so that your skin can heal.  I recommend repeat colonoscopy exam in 5 years.  .  .  .       Home Care Instructions for Colonoscopy with Sedation    Diet:  - Resume your regular diet as tolerated unless otherwise instructed.  - Start with light meals to minimize bloating.  - Do not drink alcohol today.    Medication:  - If you have questions about resuming your normal medications, please contact your Primary Care Physician.    Activities:  - Take it easy today. Do not return to work today.  - Do not drive today.  - Do not operate any machinery today (including kitchen equipment).    Colonoscopy:  - You may notice some rectal \"spotting\" (a little blood on the toilet tissue) for a day or two after the exam. This is normal.  - If you experience any rectal bleeding (not spotting), persistent tenderness or sharp severe abdominal pains, oral temperature over 100 degrees Fahrenheit, light-headedness or  dizziness, or any other problems, contact your doctor.    **If unable to reach your doctor, please go to the [facility] Emergency Room**    - Your referring physician will receive a full report of your examination.  - If you do not hear from your doctor's office within two weeks of your biopsy, please call them for your results.    You may be able to see your laboratory results in [employer] between 4 and 7 business days.  In some cases, your physician may not have viewed the results before they are released to pic5.  If you have questions regarding your results contact the physician who ordered the test/exam by phone or via pic5 by choosing \"Ask a Medical Question.\"

## 2025-04-11 NOTE — ANESTHESIA POSTPROCEDURE EVALUATION
Patient: Jorge Dumont    Procedure Summary       Date: 04/11/25 Room / Location: [facility] ENDOSCOPY 03 / [facility] ENDOSCOPY; [facility]    Anesthesia Start: 0746 Anesthesia Stop:     Procedures:       PROCEDURE MAC      COLONOSCOPY Diagnosis:       Hx of colonic polyp      Family history of colon cancer      (colon polyp, internal hemorrhoids)    Scheduled Providers: John Davalos MD Anesthesiologist: Rahul Yusuf MD    Anesthesia Type: MAC ASA Status: 2            Anesthesia Type: MAC    Vitals Value Taken Time   /52 04/11/25 08:47   Temp 97.2 04/11/25 08:47   Pulse 74 04/11/25 08:47   Resp 14 04/11/25 08:47   SpO2 98% RA 04/11/25 08:47       [facility] AN Post Evaluation:   Patient Evaluated in floor  Patient Participation: complete - patient participated  Level of Consciousness: awake  Pain Score: 0  Pain Management: adequate  Airway Patency:patent  Yes    Nausea/Vomiting: none  Cardiovascular Status: acceptable  Respiratory Status: acceptable  Postoperative Hydration acceptable      Rahul Yusuf MD  4/11/2025 8:47 AM

## 2025-04-11 NOTE — ANESTHESIA PREPROCEDURE EVALUATION
Anesthesia PreOp Note    HPI:     Jorge Dumont is a 59 year old male who presents for preoperative consultation requested by: John Davalos MD    Date of Surgery: 4/11/2025    Procedure(s):  COLONOSCOPY  Indication: Hx of colonic polyp/ Family history of colon cancer    Relevant Problems   No relevant active problems       NPO:  Last Liquid Consumption Date: 04/11/25  Last Liquid Consumption Time: 0245  Last Solid Consumption Date: 04/10/25  Last Solid Consumption Time: 1100  Last Liquid Consumption Date: 04/11/25          History Review:  Patient Active Problem List    Diagnosis Date Noted    Osteoarthritis, shoulder 01/08/2025    Right wrist pain 01/26/2019    C2-3 mild-mod central, C3-4 mild-mod diffuse & right mod foraminal, C4-5 mild diffuse & left mild-mod foraminal, C5-6 mod diffuse, C7-T1 mod diffuse bulging discs 01/26/2019    C5-6 right mod, C6-7 mod central & left herniated discs 01/26/2019    C3-4 right mod-severe foraminal & mild-mod central, C4-5 left mild foraminal, C5-6 mod-severe central & right mod-severe/left mod foraminal, C6-7 & C7-T1 mod-severe central stenosis 01/26/2019    L5-S1 left profound/right severe, L3-4/L4-5 left mod foraminal stenosis 12/03/2018    Ulnar neuropathy of right upper extremity 04/18/2018    right supraspinatus moderate-large articular and bursal surface tears with moderate full thickness, Infraspintus 90% full thicknees tear, Subscapularis large articular surface tear 04/18/2018    Baker cyst, right 08/22/2017    Leg DVT (deep venous thromboembolism), acute, right ([facility]) 08/22/2017    right shoulder severe degenerative arthritis 03/27/2017    Adhesive capsulitis of right shoulder 03/27/2017    Biceps tendonitis on right 03/27/2017    right C6 and C8 radiculopathies 03/27/2017    History of total hip arthroplasty, right 05/13/2016    right hip severe OA and s/p GEORGE 02/06/2016    Right hip pain 01/26/2016    Right low back pain 01/26/2016    Left low back  pain     Right shoulder pain     right > left L5-S1 radiculopathy 06/26/2014    L5-S1 grade 2-3 spondylolisthesis 06/26/2014    L1-2 grade 1 retrolisthesis 06/26/2014    L4-5 left mod/right mild foraminal, L3-4 bilateral mod foraminal, L1-2 mild-mod diffuse, L5-S1 left > right mod diffuse bulging discs 06/26/2014    Degeneration of lumbar or lumbosacral intervertebral disc 04/22/2013       Past Medical History[1]    Past Surgical History[2]    Prescriptions Prior to Admission[3]  Current Medications and Prescriptions Ordered in Epic[4]    Allergies[5]    Family History[6]  Social Hx on file[7]    Available pre-op labs reviewed.             Vital Signs:  Body mass index is 27.44 kg/m².   height is 1.676 m (5' 6\") and weight is 77.1 kg (170 lb). His blood pressure is 124/88 and his pulse is 82. His respiration is 15 and oxygen saturation is 94%.   Vitals:    04/03/25 1307 04/11/25 0709   BP:  124/88   Pulse:  82   Resp:  15   SpO2:  94%   Weight: 77.1 kg (170 lb) 77.1 kg (170 lb)   Height: 1.676 m (5' 6\") 1.676 m (5' 6\")        Anesthesia Evaluation      Airway   Mallampati: II  TM distance: >3 FB  Neck ROM: full  Dental - Dentition appears grossly intact     Pulmonary - normal exam   Cardiovascular - normal exam    Neuro/Psych    (+)  neuromuscular disease,        GI/Hepatic/Renal      Endo/Other    (+) arthritis  Abdominal  - normal exam                 Anesthesia Plan:   ASA:  2  Plan:   MAC  Informed Consent Plan and Risks Discussed With:  Patient      I have informed Jorge Dumont and/or legal guardian or family member of the nature of the anesthetic plan, benefits, risks including possible dental damage if relevant, major complications, and any alternative forms of anesthetic management.   All of the patient's questions were answered to the best of my ability. The patient desires the anesthetic management as planned.  Rahul Yusuf MD  4/11/2025 7:28 AM  Present on Admission:  **None**           [1]    Past Medical History:   Calculus of kidney    DVT (deep venous thrombosis) ([facility])    RLE; popliteal vein    Injury of musculoskeletal system    per [employer]:  \"Bicep and tendon tear\"    Lower back pain    RESOLVED after hip    Osteoarthritis    Osteonecrosis of femur due to previous trauma, right ([facility])    Visual impairment    wears eyeglasses   [2]   Past Surgical History:  Procedure Laterality Date    Anesth,surgery of shoulder  2007    Back surgery  06/2014    cortisone injection    Colonoscopy N/A 12/14/2021    Procedure: COLONOSCOPY;  Surgeon: John Davalos MD;  Location: [facility] ENDOSCOPY     pr closed tx post hip arthroplasty disloc no anesth Right 05/09/2016    Musculoskeletal surgery unlisted      per [employer]:  \"repair (of bicep and tendon tear)    Other surgical history      1/2025 RIGHT SHOULDER SURGERY    Total hip replacement      RIGHT 2016   [3]   Medications Prior to Admission   Medication Sig Dispense Refill Last Dose/Taking    Ibuprofen (ADVIL) 200 MG Oral Cap Take 1 capsule (200 mg total) by mouth as needed.       acetaminophen 500 MG Oral Tab Take 1 tablet (500 mg total) by mouth every 6 (six) hours as needed for Pain.       amoxicillin 500 MG Oral Cap Take 1 capsule (500 mg total) by mouth As Directed. For dental procedure      [4]   Current Facility-Administered Medications Ordered in Epic   Medication Dose Route Frequency Provider Last Rate Last Admin    lactated ringers infusion   Intravenous Continuous John Davalos MD 20 mL/hr at 04/11/25 0700 New Bag at 04/11/25 0700     No current [facility]-ordered outpatient medications on file.   [5] No Known Allergies  [6]   Family History  Problem Relation Age of Onset    Diabetes Father     Colon Cancer Father     Diabetes Mother     Heart Disease Mother     Hypertension Mother     Heart Disease Other         Family h/o CAD    Diabetes Other         Family h/o Diabetes    Seizure Disorder Daughter     Alcohol and Other Disorders  Associated Brother         Alcoholism    Other (Other) Brother         Drug abuse    Asthma Other         Nephew    Bleeding Disorders Neg     Anemia Neg     Clotting Disorder Neg    [7]   Social History  Socioeconomic History    Marital status:    Tobacco Use    Smoking status: Some Days     Types: Cigars    Smokeless tobacco: Former     Types: Chew    Tobacco comments:     1/2 cigar per day x 15 years.  Q     Quit chewing tobacco in 2010; former 20 yrs of use   Vaping Use    Vaping status: Never Used   Substance and Sexual Activity    Alcohol use: Yes     Alcohol/week: 0.0 standard drinks of alcohol     Comment: socially    Drug use: No   Other Topics Concern    Caffeine Concern Yes     Comment: 2 cups coffee daily    Sleep Concern Yes    Exercise Yes     Comment: walking daily    Right Handed Yes

## 2025-04-11 NOTE — OPERATIVE REPORT
[facility]    COLONOSCOPY PROCEDURE REPORT     DATE OF PROCEDURE:  4/11/2025     PCP: Roddy Meredith DO     PREOPERATIVE DIAGNOSIS: History of high risk adenomatous colon polyps, screening colonoscopy examination     POSTOPERATIVE DIAGNOSIS:  See impression.     SURGEON:  John Davalos M.D.     SEDATION:    MAC anesthesia provided by the Anesthesia Service.  MAC anesthesia requested due to anticipated intolerance of colonoscopy examination under safe doses conscious sedation medications    COLONOSCOPY PROCEDURE:   After the nature and risks of colonoscopy examination under MAC anesthesia were discussed with the patient and all questions answered, informed consent was obtained.  The patient was sedated as above.      Digital rectal exam was performed which showed no masses.  The Olympus pediatric video colonoscope was placed in the patient's rectum and advanced under direct visualization through the entire length of the colon up to the cecum.  Unable to perform retroflex exam ascending colon due to severe looping; therefore multiple exams performed up the ascending colon to the hepatic flexure.  The cecum was confirmed by landmarks including appendiceal orifice, cecal trifold, ileocecal valve.  Retroflexion was performed in the rectum.    The quality of the prep was excellent.    Estimated blood loss: none/insignificant       COLONOSCOPY FINDINGS:    6 mm sessile colon polyp found and removed from the mid transverse colon by cold snare polypectomy technique, suctioned out.    Small internal hemorrhoids.    RECOMMENDATIONS:  High fiber diet.  Follow-up above colon polyp pathology results.  Repeat colonoscopy examination in 5 years.  No aspirin or NSAID medications for next 5 days to prevent bleeding

## 2025-04-11 NOTE — H&P
History & Physical Examination    Patient Name: Jorge Dumont  MRN: [id number]  CSN: [id number]  YOB: 1965    Diagnosis: History high risk adenomatous colon polyps, screening colonoscopy examination    PRESENT ILLNESS: Healthy 59-year-old gentleman with history of spinal and peripheral arthritis concerns, recovering from recent right shoulder replacement surgery who presents for follow-up colonoscopy examination      BMI Readings from Last 1 Encounters:   04/11/25 27.44 kg/m²         Prescriptions Prior to Admission[1]  Current Hospital Medications[2]    Allergies: Allergies[3]    Past Medical History[4]  Past Surgical History[5]  Family History[6]  Social History     Tobacco Use    Smoking status: Some Days     Types: Cigars    Smokeless tobacco: Former     Types: Chew    Tobacco comments:     1/2 cigar per day x 15 years.  Q     Quit chewing tobacco in 2010; former 20 yrs of use   Substance Use Topics    Alcohol use: Yes     Alcohol/week: 0.0 standard drinks of alcohol     Comment: socially       SYSTEM Check if Review is Normal Check if Physical Exam is Normal If not normal, please explain:   HEENT [ ] [X ]    NECK & BACK [ ] [ ]    HEART [ X ] [ X ]    LUNGS [ X ] [ X ]    ABDOMEN [ X ] [ X ]    UROGENITAL [ ] [ ]    EXTREMITIES [ ] [ ]    OTHER        See Anesthesia documentation    [ x ] I have discussed the risks and benefits and alternatives with the patient/family.  They understand and agree to proceed with plan of care.  [ x ] I have reviewed the History and Physical done within the last 30 days.  Any changes noted above.    John Davalos MD  4/11/2025  7:49 AM             [1]   Medications Prior to Admission   Medication Sig Dispense Refill Last Dose/Taking    Ibuprofen (ADVIL) 200 MG Oral Cap Take 1 capsule (200 mg total) by mouth as needed.       acetaminophen 500 MG Oral Tab Take 1 tablet (500 mg total) by mouth every 6 (six) hours as needed for Pain.       amoxicillin 500 MG  Oral Cap Take 1 capsule (500 mg total) by mouth As Directed. For dental procedure      [2]   Current Facility-Administered Medications   Medication Dose Route Frequency    lactated ringers infusion   Intravenous Continuous   [3] No Known Allergies  [4]   Past Medical History:   Calculus of kidney    DVT (deep venous thrombosis) ([facility])    RLE; popliteal vein    Injury of musculoskeletal system    per [employer]:  \"Bicep and tendon tear\"    Lower back pain    RESOLVED after hip    Osteoarthritis    Osteonecrosis of femur due to previous trauma, right ([facility])    Visual impairment    wears eyeglasses   [5]   Past Surgical History:  Procedure Laterality Date    Anesth,surgery of shoulder  2007    Back surgery  06/2014    cortisone injection    Colonoscopy N/A 12/14/2021    Procedure: COLONOSCOPY;  Surgeon: John Davalos MD;  Location: [facility] ENDOSCOPY     pr closed tx post hip arthroplasty disloc no anesth Right 05/09/2016    Musculoskeletal surgery unlisted      per [employer]:  \"repair (of bicep and tendon tear)    Other surgical history      1/2025 RIGHT SHOULDER SURGERY    Total hip replacement      RIGHT 2016   [6]   Family History  Problem Relation Age of Onset    Diabetes Father     Colon Cancer Father     Diabetes Mother     Heart Disease Mother     Hypertension Mother     Heart Disease Other         Family h/o CAD    Diabetes Other         Family h/o Diabetes    Seizure Disorder Daughter     Alcohol and Other Disorders Associated Brother         Alcoholism    Other (Other) Brother         Drug abuse    Asthma Other         Nephew    Bleeding Disorders Neg     Anemia Neg     Clotting Disorder Neg

## 2025-05-05 ENCOUNTER — TELEPHONE (OUTPATIENT)
Facility: CLINIC | Age: 60
End: 2025-05-05

## 2025-05-05 NOTE — TELEPHONE ENCOUNTER
----- Message from John Davalos sent at 5/4/2025 11:34 AM CDT -----  GI RNs - please recall for colonoscopy exam in 5yrs   ----- Message -----  From: Lab, Background User  Sent: 4/11/2025   4:11 PM CDT  To: John Davalos MD

## 2025-05-05 NOTE — TELEPHONE ENCOUNTER
Health Maintenance Updated.    5 year colonoscopy recall entered into patient outreach in [facility].  Next colonoscopy will be due 4/11/2030.

## 2025-05-12 PROBLEM — Z71.6 ENCOUNTER FOR TOBACCO USE CESSATION COUNSELING: Status: ACTIVE | Noted: 2025-05-12

## 2025-05-13 ENCOUNTER — MED REC SCAN ONLY (OUTPATIENT)
Dept: FAMILY MEDICINE CLINIC | Facility: CLINIC | Age: 60
End: 2025-05-13

## 2025-07-22 ENCOUNTER — OFFICE VISIT (OUTPATIENT)
Dept: FAMILY MEDICINE CLINIC | Facility: CLINIC | Age: 60
End: 2025-07-22
Payer: COMMERCIAL

## 2025-07-22 VITALS
WEIGHT: 177 LBS | DIASTOLIC BLOOD PRESSURE: 80 MMHG | BODY MASS INDEX: 28.45 KG/M2 | HEART RATE: 77 BPM | SYSTOLIC BLOOD PRESSURE: 124 MMHG | TEMPERATURE: 98 F | HEIGHT: 66 IN

## 2025-07-22 DIAGNOSIS — Z96.611 HISTORY OF RIGHT SHOULDER REPLACEMENT: ICD-10-CM

## 2025-07-22 DIAGNOSIS — Z00.00 ADULT GENERAL MEDICAL EXAM: Primary | ICD-10-CM

## 2025-07-22 DIAGNOSIS — Z86.718 HISTORY OF DEEP VENOUS THROMBOSIS (DVT) OF DISTAL VEIN OF RIGHT LOWER EXTREMITY: ICD-10-CM

## 2025-07-22 DIAGNOSIS — M19.011 PRIMARY OSTEOARTHRITIS OF RIGHT SHOULDER: ICD-10-CM

## 2025-07-22 PROCEDURE — 99396 PREV VISIT EST AGE 40-64: CPT | Performed by: FAMILY MEDICINE

## 2025-07-22 PROCEDURE — 3079F DIAST BP 80-89 MM HG: CPT | Performed by: FAMILY MEDICINE

## 2025-07-22 PROCEDURE — 3008F BODY MASS INDEX DOCD: CPT | Performed by: FAMILY MEDICINE

## 2025-07-22 PROCEDURE — 3074F SYST BP LT 130 MM HG: CPT | Performed by: FAMILY MEDICINE

## 2025-07-22 NOTE — PATIENT INSTRUCTIONS
CORONARY CALCIUM HEART SCORE SCAN    You may be interested in a coronary calcium heart scan.  This test used to be $1000 years ago but now is $100 or less at the hospital with payment at the time of the test.  Insurance does not cover this test but I find it gives us very important information such as how much cholesterol you have in the arteries of your heart.  I think it is a very worthwhile test and I did it myself.      They will send me the results as well so we could discuss further.  Depending on how much cholesterol plaque is in the heart, this could dictate if we put you on cholesterol medication or not, or if it so high that you need to see a cardiologist.  If you do want to do this test you do not need my permission.  Just call [phone] and tell them you want a coronary calcium heart scan.

## 2025-07-22 NOTE — PROGRESS NOTES
Patient ID: Jorge Dumont is a 60 year old male.         The following individual(s) verbally consented to be recorded using ambient AI listening technology and understand that they can each withdraw their consent to this listening technology at any point by asking the clinician to turn off or pause the recording:    Patient name: Jorge Dumont  Additional names:            HPI  Chief Complaint   Patient presents with    Routine Physical       History of Present Illness  Jorge Dumont is a 60 year old male who presents for routine follow-up and lab work.    He is not currently on any blood pressure medications, and his blood pressure was noted to be good today. He plans to fast for his upcoming lab tests, which include blood count, liver, kidney, cholesterol, and thyroid function tests.    He has a history of elevated cholesterol with a previous LDL level of 161 mg/dL. He is scheduled to have his cholesterol levels rechecked.    He underwent right shoulder replacement surgery, which was successful. He reports significant improvement in strength and range of motion, allowing him to engage in activities such as playing baseball with his granddaughter.    He has a history of deep vein thrombosis (DVT) in the right calf years ago. He experiences occasional swelling in the right calf, which he monitors closely. The calf sometimes appears larger to him, but he monitors it closely.  No discomfort . is no shortness of breath.  He still very active.    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 He is active, walking his dog regularly and monitoring his daily steps, achieving over 14,000 steps on some days.    He has dietary habits of having coffee and juice in the morning, an apple and protein bar for lunch, and a dinner that includes items like bratwurst and potato salad. He is trying to manage his weight but finds it challenging. He is  considering adjusting his meal frequency and portion sizes to aid weight loss.    No significant issues with urination, typically waking up once or not at all during the night to urinate. He has been increasing his water intake to aid in dietary management.    Health Maintenance   Topic Date Due    Pneumococcal Vaccine: 50+ Years (1 of 1 - PCV) Never done    COVID-19 Vaccine (7 - 2024-25 season) 09/01/2024    Annual Depression Screening  01/01/2025    Annual Physical  05/20/2025    Influenza Vaccine (1) 10/01/2025    PSA  06/01/2026    DTaP,Tdap,and Td Vaccines (2 - Td or Tdap) 11/24/2027    Colorectal Cancer Screening  04/11/2030    Tobacco Cessation Counseling  Completed    Zoster Vaccines  Completed    Meningococcal B Vaccine  Aged Out       Results  LABS    PSA: 1.39    Thyroid: Normal    Cholesterol:       =======================================================    Lab Results   Component Value Date    WBC 6.3 06/01/2024    RBC 4.95 06/01/2024    HGB 15.2 06/01/2024    HCT 47.6 06/01/2024    .0 06/01/2024    MPV 7.3 (L) 03/14/2015    MCV 96.2 06/01/2024    MCH 30.7 06/01/2024    MCHC 31.9 06/01/2024    RDW 12.9 06/01/2024    NEPRELIM 3.98 06/01/2024    NEUT 3.8 03/14/2015    LYMPH 1.4 03/14/2015    MON 0.6 03/14/2015    EOS 0.1 03/14/2015    BASO 0.0 03/14/2015    NEPERCENT 62.9 06/01/2024    LYPERCENT 24.1 06/01/2024    MOPERCENT 9.3 06/01/2024    EOPERCENT 2.7 06/01/2024    BAPERCENT 0.5 06/01/2024    NE 3.98 06/01/2024    LYMABS 1.52 06/01/2024    MOABSO 0.59 06/01/2024    EOABSO 0.17 06/01/2024    BAABSO 0.03 06/01/2024       Lab Results   Component Value Date    GLU 85 06/01/2024    BUN 14 06/01/2024    BUNCREA 14.1 06/01/2024    CREATSERUM 0.99 06/01/2024    ANIONGAP 7 06/01/2024    GFRNAA 71 01/06/2020    GFRAA 83 01/06/2020    CA 9.2 06/01/2024    OSMOCALC 292 06/01/2024    ALKPHO 99 06/01/2024    AST 29 06/01/2024    ALT 30 06/01/2024    ALKPHOS 88 03/14/2015    BILT 0.7 06/01/2024    TP  7.0 06/01/2024    ALB 4.3 06/01/2024    GLOBULIN 2.7 06/01/2024    AGRATIO 1.3 03/14/2015     06/01/2024    K 4.1 06/01/2024     06/01/2024    CO2 25.0 06/01/2024       Lab Results   Component Value Date    GLU 85 06/01/2024    BUN 14 06/01/2024    CREATSERUM 0.99 06/01/2024    BUNCREA 14.1 06/01/2024    ANIONGAP 7 06/01/2024    GFRAA 83 01/06/2020    GFRNAA 71 01/06/2020    CA 9.2 06/01/2024     06/01/2024    K 4.1 06/01/2024     06/01/2024    CO2 25.0 06/01/2024    OSMOCALC 292 06/01/2024       No results found for: \"COLORUR\", \"CLARITY\", \"SPECGRAVITY\", \"GLUUR\", \"BILUR\", \"KETUR\", \"BLOODURINE\", \"PHURINE\", \"PROUR\", \"UROBILINOGEN\", \"NITRITE\", \"LEUUR\", \"ASCACIDURINE\", \"NMIC\", \"WBCUR\", \"RBCUR\", \"EPIUR\", \"HYALCASTURN\", \"BACUR\", \"CAOXUR\", \"HYLUR\", \"MICCOM\"  No results found for: \"EAG\", \"A1C\"    No results found for: \"MALBP\", \"CREUR\", \"CREAURINE\", \"MIALBURINE\", \"MCRRATIOUR\", \"MALBCRECALC\", \"MICROALBUMIN\", \"CREAUR\", \"MALBCREACALC\"    Lab Results   Component Value Date    CHOLEST 230 (H) 06/01/2024    TRIG 51 06/01/2024    HDL 61 (H) 06/01/2024     (H) 06/01/2024    VLDL 10 06/01/2024    NONHDLC 169 (H) 06/01/2024    CALCNONHDL 138 (H) 03/14/2015     TSH (mIU/mL)   Date Value   06/01/2024 2.191     TSH (S) (uIU/mL)   Date Value   03/14/2015 1.49       No results found for: \"B12\", \"VITB12\"    No results found for: \"IRON\", \"IRONTOT\"    No results found for: \"SAT\"    No results found for: \"IRON\", \"IRONTOT\", \"TIBC\", \"IRONSAT\", \"TRANSFERRIN\", \"TIBCP\", \"IRONBIND\", \"SAT\", \"SATUR\"    No results found for: \"AMERICA\"    No results found for: \"VITD\", \"QVITD\", \"[id number]\"  Lab Results   Component Value Date    TOTPSASCREEN 1.7 03/14/2015       =======================================================    Wt Readings from Last 6 Encounters:   07/22/25 177 lb (80.3 kg)   05/12/25 175 lb (79.4 kg)   04/11/25 170 lb (77.1 kg)   01/08/25 171 lb (77.6 kg)   12/19/24 177 lb (80.3 kg)   05/20/24 172 lb 3.2 oz (78.1 kg)                BMI Readings from Last 6 Encounters:   07/22/25 28.57 kg/m²   05/12/25 28.25 kg/m²   04/11/25 27.44 kg/m²   01/08/25 27.60 kg/m²   12/19/24 28.57 kg/m²   05/20/24 27.79 kg/m²       BP Readings from Last 6 Encounters:   07/22/25 127/87   05/12/25 118/70   04/11/25 (!) 112/93   01/09/25 133/90   12/19/24 110/80   05/20/24 121/85         Review of Systems  No exertional cardiac chest pain or shortness of breath unless stated in HPI which would take precedence.  See HPI for further review of systems.    Past Medical History:    Calculus of kidney    DVT (deep venous thrombosis) (HCC)    RLE; popliteal vein    Injury of musculoskeletal system    per [employer]:  \"Bicep and tendon tear\"    Lower back pain    RESOLVED after hip    Osteoarthritis    Osteonecrosis of femur due to previous trauma, right ([facility])    Visual impairment    wears eyeglasses       Past Surgical History:   Procedure Laterality Date    Anesth,surgery of shoulder  2007    Back surgery  06/2014    cortisone injection    Colonoscopy N/A 12/14/2021    Procedure: COLONOSCOPY;  Surgeon: John Davalos MD;  Location: [facility] ENDOSCOPY    Colonoscopy  04/11/2025    Dr. Davalos, colon polyp, internal hemorrhoids    Colonoscopy N/A 4/11/2025    Procedure: COLONOSCOPY;  Surgeon: John Davalos MD;  Location: [facility] ENDOSCOPY    Eh pr closed tx post hip arthroplasty disloc no anesth Right 05/09/2016    Musculoskeletal surgery unlisted      per [facility]:  \"repair (of bicep and tendon tear)    Other surgical history      1/2025 RIGHT SHOULDER SURGERY    Total hip replacement      RIGHT 2016       Social History     Socioeconomic History    Marital status:      Spouse name: Not on file    Number of children: Not on file    Years of education: Not on file    Highest education level: Not on file   Occupational History    Not on file   Tobacco Use    Smoking status: Some Days     Types: Cigars    Smokeless tobacco: Former     Types: Chew     Tobacco comments:     1/2 cigar per day x 15 years.  Q     Quit chewing tobacco in 2010; former 20 yrs of use   Vaping Use    Vaping status: Never Used   Substance and Sexual Activity    Alcohol use: Yes     Alcohol/week: 0.0 standard drinks of alcohol     Comment: socially    Drug use: No    Sexual activity: Not on file   Other Topics Concern     Service Not Asked    Blood Transfusions Not Asked    Caffeine Concern Yes     Comment: 2 cups coffee daily    Occupational Exposure Not Asked    Hobby Hazards Not Asked    Sleep Concern Yes    Stress Concern Not Asked    Weight Concern Not Asked    Special Diet Not Asked    Back Care Not Asked    Exercise Yes     Comment: walking daily    Bike Helmet Not Asked    Seat Belt Not Asked    Self-Exams Not Asked    Left Handed Not Asked    Right Handed Yes    Currently spends a great deal of time in the sun Not Asked    Past Sunlamp Treatments for Acne Not Asked    History of tanning Not Asked    Hx of Spending Great Deal of Time in Sun Not Asked    Bad sunburns in the past Not Asked    Tanning Salons in the Past Not Asked    Hx of Radiation Treatments Not Asked    Regular use of sun block Not Asked   Social History Narrative    Luis iMguel is  x 31 yrs; [place]. Father of 2 adult daughters. He works as a san. Luis Miguel, wife and children live in [place], [place]. Patient plays golf regularly.         The patient does not use an assistive device..      The patient does live in a home with stairs.         Social Drivers of Health     Food Insecurity: Not on file   Transportation Needs: Not on file   Stress: Not on file   Housing Stability: Not on file          Current Outpatient Medications   Medication Sig Dispense Refill    Ibuprofen (ADVIL) 200 MG Oral Cap Take 1 capsule (200 mg total) by mouth as needed.      acetaminophen 500 MG Oral Tab Take 1 tablet (500 mg total) by mouth every 6 (six) hours as needed for Pain. (Patient not taking: Reported on 7/22/2025)        Allergies:No Known Allergies   PHYSICAL EXAM:   Physical Exam  Physical Exam   Constitutional: He appears well-developed and well-nourished. No distress.   HENT:   Head: Normocephalic.   Right Ear: Tympanic membrane and ear canal normal.   Left Ear: Tympanic membrane and ear canal normal.   Mouth/Throat: Oropharynx is clear and moist and mucous membranes are normal.   Eyes: Conjunctivae and EOM are normal. Pupils are equal, round, and reactive to light.   Neck: Normal range of motion. Neck supple. No thyromegaly present.   Cardiovascular: Normal rate, regular rhythm and no  murmur heard.  Pulmonary/Chest: Effort normal and breath sounds normal. No respiratory distress.   Abdominal: Soft. Bowel sounds are normal. There is no hepatosplenomegaly. There is no tenderness.   Lymphadenopathy:     He has no cervical adenopathy.   Neurological: He is alert and oriented to person, place, and time. He has normal reflexes. No cranial nerve deficit.   Skin: Skin is warm and dry. No rash noted.   Psychiatric: He has a normal mood and affect.   Lower legs: No edema of the legs bilaterally.  No tenderness of the calf.  Each calf was 38.5 cm.  Gait was normal.    Physical Exam  VITALS: BP- 124/80  HEENT: Ears normal. Nose normal, no obstruction. Oral cavity normal.  CHEST: Lungs clear to auscultation bilaterally.  CARDIOVASCULAR: Heart sounds normal. Pedal pulses 2+ bilaterally.  ABDOMEN: Abdomen normal.  EXTREMITIES: No cords or hardness in right leg. Good hair distribution, indicating good blood flow.  Vitals reviewed.  Vitals:    07/22/25 0900 07/22/25 0912   BP: 127/87 124/80   Pulse: 77    Temp: 97.5 °F (36.4 °C)    TempSrc: Temporal    Weight: 177 lb (80.3 kg)    Height: 5' 6\" (1.676 m)        Blood pressure 127/87, pulse 77, temperature 97.5 °F (36.4 °C), temperature source Temporal, height 5' 6\" (1.676 m), weight 177 lb (80.3 kg).               ASSESSMENT/PLAN:     Diagnoses and all orders for this visit:    Adult  general medical exam  -     CBC With Differential With Platelet; Future  -     Comp Metabolic Panel (14); Future  -     Lipid Panel; Future  -     PSA Total, Screen; Future  -     Assay, Thyroid Stim Hormone; Future  We discussed the elevated cholesterol in the past.  He never did do the calcium score and I gave him information about this again and explained why I find it so important.  right shoulder severe degenerative arthritis  He did great after the right shoulder replacement.  He is very happy with the outcome and his range of motion.  History of right shoulder replacement  As above  History of deep venous thrombosis (DVT) of distal vein of right lower extremity  Reassured I did not see any swelling today and really normal exam.  He is not a sedentary person.      Referrals (if applicable)  No orders of the defined types were placed in this encounter.        Follow up if symptoms persist.  Take medicine (if given) as prescribed.  Approach to treatment discussed and patient/family member understands and agrees to plan.     No follow-ups on file.      Assessment & Plan  Hyperlipidemia  LDL cholesterol at 161 mg/dL, exceeding the target of <100 mg/dL, elevating risk for atherosclerosis and cardiovascular events. Discussed coronary calcium score, a five-minute CT scan quantifying coronary artery plaque, with scores from 0 (no plaque) to >1000 (high risk for heart disease). Test costs $75-$100, not covered by insurance, recommended for high cholesterol to assess cardiovascular risk.  - Order lipid panel  - Discuss coronary calcium score    Deep Venous Thrombosis (DVT) of Right Lower Extremity  DVT in right lower extremity. Right calf sensation of increased size, but measurements show equal circumference. No cords, hardness, or compromised blood flow. Good hair distribution and pedal pulses indicate adequate circulation.  - Monitor for changes in right calf    Right Shoulder Replacement  Post right shoulder  replacement by Dr. Bebo Vang, with improved range of motion and strength, enabling activities like throwing a baseball. Surgery initially considered for reverse shoulder replacement, but anatomical correction achieved.    General Health Maintenance  Good health, normal blood pressure, no chronic condition medications. Emphasized healthy diet and regular exercise for weight management. Advised three balanced meals daily, avoiding bread, pasta, rice, and potatoes. BMI not overweight, but weight concentrated in abdominal area.  - Order comprehensive metabolic panel including blood count, liver, kidney, cholesterol, and thyroid tests  - Advise fasting before blood tests, allowing water and black coffee only  - Encourage three balanced meals daily  - Advise avoiding bread, pasta, rice, and potatoes  - Ensure tetanus vaccination is up to date, next due in 2027    Follow-up  Scheduled for blood tests on Saturday to assess cholesterol and other health markers.  Roddy Meredith,   7/22/2025      .

## 2025-07-26 ENCOUNTER — LAB ENCOUNTER (OUTPATIENT)
Dept: LAB | Facility: HOSPITAL | Age: 60
End: 2025-07-26
Attending: FAMILY MEDICINE
Payer: COMMERCIAL

## 2025-07-26 DIAGNOSIS — Z00.00 ADULT GENERAL MEDICAL EXAM: ICD-10-CM

## 2025-07-26 LAB
ALBUMIN SERPL-MCNC: 4.6 G/DL (ref 3.2–4.8)
ALBUMIN/GLOB SERPL: 1.8 (ref 1–2)
ALP LIVER SERPL-CCNC: 104 U/L (ref 45–117)
ALT SERPL-CCNC: 25 U/L (ref 10–49)
ANION GAP SERPL CALC-SCNC: 5 MMOL/L (ref 0–18)
AST SERPL-CCNC: 23 U/L (ref ?–34)
BASOPHILS # BLD AUTO: 0.04 X10(3) UL (ref 0–0.2)
BASOPHILS NFR BLD AUTO: 0.6 %
BILIRUB SERPL-MCNC: 1 MG/DL (ref 0.2–1.1)
BUN BLD-MCNC: 11 MG/DL (ref 9–23)
BUN/CREAT SERPL: 9.9 (ref 10–20)
CALCIUM BLD-MCNC: 9.2 MG/DL (ref 8.7–10.4)
CHLORIDE SERPL-SCNC: 106 MMOL/L (ref 98–112)
CHOLEST SERPL-MCNC: 218 MG/DL (ref ?–200)
CO2 SERPL-SCNC: 27 MMOL/L (ref 21–32)
COMPLEXED PSA SERPL-MCNC: 1.99 NG/ML (ref ?–4)
CREAT BLD-MCNC: 1.11 MG/DL (ref 0.7–1.3)
DEPRECATED RDW RBC AUTO: 42.7 FL (ref 35.1–46.3)
EGFRCR SERPLBLD CKD-EPI 2021: 75 ML/MIN/1.73M2 (ref 60–?)
EOSINOPHIL # BLD AUTO: 0.19 X10(3) UL (ref 0–0.7)
EOSINOPHIL NFR BLD AUTO: 2.7 %
ERYTHROCYTE [DISTWIDTH] IN BLOOD BY AUTOMATED COUNT: 12.4 % (ref 11–15)
FASTING PATIENT LIPID ANSWER: YES
FASTING STATUS PATIENT QL REPORTED: YES
GLOBULIN PLAS-MCNC: 2.5 G/DL (ref 2–3.5)
GLUCOSE BLD-MCNC: 84 MG/DL (ref 70–99)
HCT VFR BLD AUTO: 45.8 % (ref 39–53)
HDLC SERPL-MCNC: 60 MG/DL (ref 40–59)
HGB BLD-MCNC: 15.4 G/DL (ref 13–17.5)
IMM GRANULOCYTES # BLD AUTO: 0.03 X10(3) UL (ref 0–1)
IMM GRANULOCYTES NFR BLD: 0.4 %
LDLC SERPL CALC-MCNC: 143 MG/DL (ref ?–100)
LYMPHOCYTES # BLD AUTO: 1.58 X10(3) UL (ref 1–4)
LYMPHOCYTES NFR BLD AUTO: 22.9 %
MCH RBC QN AUTO: 31.9 PG (ref 26–34)
MCHC RBC AUTO-ENTMCNC: 33.6 G/DL (ref 31–37)
MCV RBC AUTO: 94.8 FL (ref 80–100)
MONOCYTES # BLD AUTO: 0.73 X10(3) UL (ref 0.1–1)
MONOCYTES NFR BLD AUTO: 10.6 %
NEUTROPHILS # BLD AUTO: 4.34 X10 (3) UL (ref 1.5–7.7)
NEUTROPHILS # BLD AUTO: 4.34 X10(3) UL (ref 1.5–7.7)
NEUTROPHILS NFR BLD AUTO: 62.8 %
NONHDLC SERPL-MCNC: 158 MG/DL (ref ?–130)
OSMOLALITY SERPL CALC.SUM OF ELEC: 285 MOSM/KG (ref 275–295)
PLATELET # BLD AUTO: 331 10(3)UL (ref 150–450)
POTASSIUM SERPL-SCNC: 4 MMOL/L (ref 3.5–5.1)
PROT SERPL-MCNC: 7.1 G/DL (ref 5.7–8.2)
RBC # BLD AUTO: 4.83 X10(6)UL (ref 4.3–5.7)
SODIUM SERPL-SCNC: 138 MMOL/L (ref 136–145)
TRIGL SERPL-MCNC: 83 MG/DL (ref 30–149)
TSI SER-ACNC: 2.01 UIU/ML (ref 0.55–4.78)
VLDLC SERPL CALC-MCNC: 15 MG/DL (ref 0–30)
WBC # BLD AUTO: 6.9 X10(3) UL (ref 4–11)

## 2025-07-26 PROCEDURE — 85025 COMPLETE CBC W/AUTO DIFF WBC: CPT

## 2025-07-26 PROCEDURE — 84443 ASSAY THYROID STIM HORMONE: CPT

## 2025-07-26 PROCEDURE — 80053 COMPREHEN METABOLIC PANEL: CPT

## 2025-07-26 PROCEDURE — 36415 COLL VENOUS BLD VENIPUNCTURE: CPT

## 2025-07-26 PROCEDURE — 80061 LIPID PANEL: CPT

## 2025-08-05 ENCOUNTER — PATIENT MESSAGE (OUTPATIENT)
Dept: FAMILY MEDICINE CLINIC | Facility: CLINIC | Age: 60
End: 2025-08-05

## 2025-08-05 DIAGNOSIS — R93.7 ABNORMAL BONE DENSITY SCREENING: Primary | ICD-10-CM

## (undated) DEVICE — STERILE LATEX POWDER-FREE SURGICAL GLOVESWITH NITRILE COATING: Brand: PROTEXIS

## (undated) DEVICE — 3M™ STERI-STRIP™ REINFORCED ADHESIVE SKIN CLOSURES, R1547, 1/2 IN X 4 IN (12 MM X 100 MM), 6 STRIPS/ENVELOPE: Brand: 3M™ STERI-STRIP™

## (undated) DEVICE — TRAP 4 CPTR CHMBR N EZ INLN

## (undated) DEVICE — WRAP COOLING SHLDR W/ICE PILLO

## (undated) DEVICE — SUT MCRYL 2-0 27IN SH ABSRB UD 26MM 1/2 CIR

## (undated) DEVICE — LASSO POLYPECTOMY SNARE: Brand: LASSO

## (undated) DEVICE — SHOULDER P.A.D II: Brand: DEROYAL

## (undated) DEVICE — MEDI-VAC NON-CONDUCTIVE SUCTION TUBING 6MM X 1.8M (6FT.) L: Brand: CARDINAL HEALTH

## (undated) DEVICE — SET SUR GUID AND MOD R FOR ALLIANCE GLEN SIGN

## (undated) DEVICE — GAMMEX® PI HYBRID SIZE 7, STERILE POWDER-FREE SURGICAL GLOVE, POLYISOPRENE AND NEOPRENE BLEND: Brand: GAMMEX

## (undated) DEVICE — SNARE ENDOSCOPIC 10MM ROUND

## (undated) DEVICE — DRESSING ALG 3.5X10IN TAN CARBOXYMETHYL CELOS

## (undated) DEVICE — 3 BONE CEMENT MIXER: Brand: MIXEVAC

## (undated) DEVICE — 60 ML SYRINGE REGULAR TIP: Brand: MONOJECT

## (undated) DEVICE — APPLICATOR PREP 26ML CHG 2% ISO ALC 70%

## (undated) DEVICE — INSULATED NEEDLE ELECTRODE: Brand: EDGE

## (undated) DEVICE — V2 SPECIMEN COLLECTION MANIFOLD KIT: Brand: NEPTUNE

## (undated) DEVICE — KIT ENDO ORCAPOD 160/180/190

## (undated) DEVICE — Device

## (undated) DEVICE — SUT ETHBND XL 2 30IN V-37 NABSRB GRN 40MM 1/2

## (undated) DEVICE — GAMMEX® PI HYBRID SIZE 9, STERILE POWDER-FREE SURGICAL GLOVE, POLYISOPRENE AND NEOPRENE BLEND: Brand: GAMMEX

## (undated) DEVICE — 3M™ IOBAN™ 2 ANTIMICROBIAL INCISE DRAPE 6650EZ: Brand: IOBAN™ 2

## (undated) DEVICE — V2 SPECIMEN COLLECTION TRAY: Brand: NEPTUNE

## (undated) DEVICE — COVER,TABLE,60X90,STERILE: Brand: MEDLINE

## (undated) DEVICE — GAMMEX® NON-LATEX PI ORTHO SIZE 9, STERILE POLYISOPRENE POWDER-FREE SURGICAL GLOVE: Brand: GAMMEX

## (undated) DEVICE — LINE MNTR ADLT SET O2 INTMD

## (undated) DEVICE — BLADE SAW 1.14X2.17IN THK0.025IN CUT

## (undated) DEVICE — SOLUTION IRRIG 1000ML 0.9% NACL USP BTL

## (undated) DEVICE — SOLUTION IRRIG 3000ML 0.9% NACL FLX CONT

## (undated) DEVICE — TOWEL,OR,DSP,ST,BLUE,DLX,2/PK,40PK/CS: Brand: MEDLINE

## (undated) DEVICE — PACK CDS SHOULDER

## (undated) DEVICE — COVER,MAYO STAND,STERILE: Brand: MEDLINE

## (undated) DEVICE — BEACH CHAIR MASK SGL USE

## (undated) DEVICE — COVER XR CASS W21XL40IN UNIV ADH MICROSHIELD

## (undated) DEVICE — SKIN PREP TRAY 4 COMPARTM TRAY: Brand: MEDLINE INDUSTRIES, INC.

## (undated) DEVICE — KIT CLEAN ENDOKIT 1.1OZ GOWNX2

## (undated) DEVICE — SHEET,DRAPE,53X77,STERILE: Brand: MEDLINE

## (undated) DEVICE — HOOD: Brand: FLYTE

## (undated) DEVICE — SPONGE LAP 18X18IN WHT COT 4 PLY FLD STRUNG

## (undated) DEVICE — SHOULDER STABILIZATION KIT,                                    DISPOSABLE 12 PER BOX

## (undated) DEVICE — 35 ML SYRINGE REGULAR TIP: Brand: MONOJECT

## (undated) DEVICE — HANDPIECE SET WITH HIGH FLOW TIP AND SUCTION TUBE: Brand: INTERPULSE

## (undated) DEVICE — PIN STNMN 3.2MMX9IN FOR COMPHSVE REV

## (undated) NOTE — LETTER
[facility], [place]  Authorization for Invasive Procedures  1.  I hereby authorize Dr. Amie Condon , my physician and whomever may be designated as the doctor's assistant, to perform the following operation and/or procedure fever and allergic reactions, hemolytic reactions, transmission of disease such as hepatitis, AIDS, cytomegalovirus (CMV), and flluid overload.  In the event that I wish to have autologous transfusions of my own blood, or a directed donor transfusion, I melisa Signature of Patient:  ________________________________________________ Date: _________Time: _________    Responsible person in case of minor or unconscious: _____________________________Relationship: ____________     Witness Signature: _______________

## (undated) NOTE — LETTER
8/11/2021    [address] [facility] [address]            Dear Bridger Win,      Our records indicate that you are due for an appointment for a Colonoscopy in October 2021, or shortly there after, with Isabel Quinonez

## (undated) NOTE — LETTER
[facility]  Administration of Anesthesia  1. Chaya Vance, or _________________________________ acting on his behalf, (Patient) (Dependent/Representative) request to receive anesthesia for my pending procedure/operation/treatment. bleeding, seizure, cardiac arrest and death. 7. AWARENESS: I understand that it is possible (but unlikely) to have explicit memory of events from the operating room while under general anesthesia.   8. ELECTROCONVULSIVE THERAPY PATIENTS: This consent serve below affirms that prior to the time of the procedure, I have explained to the patient and/or his/her guardian, the risks and benefits of undergoing anesthesia, as well as any reasonable alternatives.     ___________________________________________________

## (undated) NOTE — LETTER
[facility]  Administration of Anesthesia  IJorge agree to be cared for by a physician anesthesiologist alone and/or with a nurse anesthetist, who is specially trained to monitor me and give me medicine to put me to sleep or keep me comfortable during my procedure    I understand that my anesthesiologist and/or anesthetist is not an employee or agent of [facility] or [employer]. He or she works for [facility]    As the patient asking for anesthesia services, I agree to:  Allow the anesthesiologist (anesthesia doctor) to give me medicine and do additional procedures as necessary. Some examples are: Starting or using an “IV” to give me medicine, fluids or blood during my procedure, and having a breathing tube placed to help me breathe when I’m asleep (intubation). In the event that my heart stops working properly, I understand that my anesthesiologist will make every effort to sustain my life, unless otherwise directed by [facility] Do Not Resuscitate documents.  Tell my anesthesia doctor before my procedure:  If I am pregnant.  The last time that I ate or drank.  iii. All of the medicines I take (including prescriptions, herbal supplements, and pills I can buy without a prescription (including street drugs/illegal medications). Failure to inform my anesthesiologist about these medicines may increase my risk of anesthetic complications.  iv.If I am allergic to anything or have had a reaction to anesthesia before.  I understand how the anesthesia medicine will help me (benefits).  I understand that with any type of anesthesia medicine there are risks:  The most common risks are: nausea, vomiting, sore throat, muscle soreness, damage to my eyes, mouth, or teeth (from breathing tube placement).  Rare risks include: remembering what happened during my procedure, allergic reactions to medications, injury to my airway, heart, lungs, vision, nerves, or  muscles and in extremely rare instances death.  My doctor has explained to me other choices available to me for my care (alternatives).  Pregnant Patients (“epidural”):  I understand that the risks of having an epidural (medicine given into my back to help control pain during labor), include itching, low blood pressure, difficulty urinating, headache or slowing of the baby’s heart. Very rare risks include infection, bleeding, seizure, irregular heart rhythms and nerve injury.  Regional Anesthesia (“spinal”, “epidural”, & “nerve blocks”):  I understand that rare but potential complications include headache, bleeding, infection, seizure, irregular heart rhythms, and nerve injury.    _____________________________________________________________________________  Patient (or Representative) Signature/Relationship to Patient  Date   Time    _____________________________________________________________________________   Name (if used)    Language/Organization   Time    _____________________________________________________________________________  Nurse Anesthetist Signature     Date   Time  _____________________________________________________________________________  Anesthesiologist Signature     Date   Time  I have discussed the procedure and information above with the patient (or patient’s representative) and answered their questions. The patient or their representative has agreed to have anesthesia services.    _____________________________________________________________________________  Witness        Date   Time  I have verified that the signature is that of the patient or patient’s representative, and that it was signed before the procedure  Patient Name: Jorge Dumont     : 5/3/1965                 Printed: 2025 at 8:46 AM    Medical Record #: [id number]                                            Page 1 of 1  ----------ANESTHESIA CONSENT----------

## (undated) NOTE — LETTER
7/8/2020    [address] [place] [address]            Dear Trang Stover,      Our records indicate that you are due for an appointment for a Colonoscopy with Prema Starr MD.    Please call our office to leyla

## (undated) NOTE — Clinical Note
AUTHORIZATION FOR SURGICAL OPERATION OR OTHER PROCEDURE    1.  I hereby authorize Dr. Triston Menard and the [facility] staff assigned to my case to perform the following operation and/or procedure at the [facility]:    Right shoulder steroid injection under u Relationship to Patient:             Parent    Responsible person                            Spouse  In case of minor or                     Other  _____________   Incompetent name:  __________________________________________________

## (undated) NOTE — LETTER
[facility]  [address], [place], [place]    Authorization for Surgical Operation and Procedure                               I hereby authorize John Davalos MD, my physician and his/her assistants (if applicable), which may include medical students, residents, and/or fellows, to perform the following surgical operation/ procedure and administer such anesthesia as may be determined necessary by my physician: Operation/Procedure name (s) COLONOSCOPY on Jorge Dumont   2.   I recognize that during the surgical operation/procedure, unforeseen conditions may necessitate additional or different procedures than those listed above.  I, therefore, further authorize and request that the above-named surgeon, assistants, or designees perform such procedures as are, in their judgment, necessary and desirable.    3.   My surgeon/physician has discussed prior to my surgery the potential benefits, risks and side effects of this procedure; the likelihood of achieving goals; and potential problems that might occur during recuperation.  They also discussed reasonable alternatives to the procedure, including risks, benefits, and side effects related to the alternatives and risks related to not receiving this procedure.  I have had all my questions answered and I acknowledge that no guarantee has been made as to the result that may be obtained.    4.   Should the need arise during my operation/procedure, which includes change of level of care prior to discharge, I also consent to the administration of blood and/or blood products.  Further, I understand that despite careful testing and screening of blood or blood products by collecting agencies, I may still be subject to ill effects as a result of receiving a blood transfusion and/or blood products.  The following are some, but not all, of the potential risks that can occur: fever and allergic reactions, hemolytic reactions, transmission of diseases  such as Hepatitis, AIDS and Cytomegalovirus (CMV) and fluid overload.  In the event that I wish to have an autologous transfusion of my own blood, or a directed donor transfusion, I will discuss this with my physician.  Check only if Refusing Blood or Blood Products  I understand refusal of blood or blood products as deemed necessary by my physician may have serious consequences to my condition to include possible death. I hereby assume responsibility for my refusal and release the hospital, its personnel, and my physicians from any responsibility for the consequences of my refusal.    o  Refuse   5.   I authorize the use of any specimen, organs, tissues, body parts or foreign objects that may be removed from my body during the operation/procedure for diagnosis, research or teaching purposes and their subsequent disposal by hospital authorities.  I also authorize the release of specimen test results and/or written reports to my treating physician on the hospital medical staff or other referring or consulting physicians involved in my care, at the discretion of the Pathologist or my treating physician.    6.   I consent to the photographing or videotaping of the operations or procedures to be performed, including appropriate portions of my body for medical, scientific, or educational purposes, provided my identity is not revealed by the pictures or by descriptive texts accompanying them.  If the procedure has been photographed/videotaped, the surgeon will obtain the original picture, image, videotape or CD.  The hospital will not be responsible for storage, release or maintenance of the picture, image, tape or CD.    7.   I consent to the presence of a  or observers in the operating room as deemed necessary by my physician or their designees.    8.   I recognize that in the event my procedure results in extended X-Ray/fluoroscopy time, I may develop a skin reaction.    9. If I have a Do Not Attempt  Resuscitation (DNAR) order in place, that status will be suspended while in the operating room, procedural suite, and during the recovery period unless otherwise explicitly stated by me (or a person authorized to consent on my behalf). The surgeon or my attending physician will determine when the applicable recovery period ends for purposes of reinstating the DNAR order.  10. Patients having a sterilization procedure: I understand that if the procedure is successful the results will be permanent and it will therefore be impossible for me to inseminate, conceive, or bear children.  I also understand that the procedure is intended to result in sterility, although the result has not been guaranteed.   11. I acknowledge that my physician has explained sedation/analgesia administration to me including the risk and benefits I consent to the administration of sedation/analgesia as may be necessary or desirable in the judgment of my physician.    I CERTIFY THAT I HAVE READ AND FULLY UNDERSTAND THE ABOVE CONSENT TO OPERATION and/or OTHER PROCEDURE.     ____________________________________  _________________________________        ______________________________  Signature of Patient    Signature of Responsible Person                Printed Name of Responsible Person                                      ____________________________________  _____________________________                ________________________________  Signature of Witness        Date  Time         Relationship to Patient    STATEMENT OF PHYSICIAN My signature below affirms that prior to the time of the procedure; I have explained to the patient and/or his/her legal representative, the risks and benefits involved in the proposed treatment and any reasonable alternative to the proposed treatment. I have also explained the risks and benefits involved in refusal of the proposed treatment and alternatives to the proposed treatment and have answered the patient's  questions. If I have a significant financial interest in a co-management agreement or a significant financial interest in any product or implant, or other significant relationship used in this procedure/surgery, I have disclosed this and had a discussion with my patient.     _____________________________________________________              _____________________________  (Signature of Physician)                                                                                         (Date)                                   (Time)  Patient Name: Jorge Dumont      : 5/3/1965      Printed: 2025     Medical Record #: [id number]                                      Page 1 of 1

## (undated) NOTE — LETTER
AUTHORIZATION FOR SURGICAL OPERATION OR OTHER PROCEDURE    1.  I hereby authorize Dr. Susan Wolfe and the [facility] staff assigned to my case to perform the following operation and/or procedure at the [facility]:    Right shoulder injection under ultrasoun Relationship to Patient:           []  Parent    Responsible person                          []  Spouse  In case of minor or                    [] Other  _____________   Incompetent name:  __________________________________________________

## (undated) NOTE — LETTER
AUTHORIZATION FOR SURGICAL OPERATION OR OTHER PROCEDURE    1.  I hereby authorize Dr. Chacha Stahl and the [facility] staff assigned to my case to perform the following operation and/or procedure at the [facility]:    Right shoulder injection under ultrasoun Patient signature:  ___________________________________________________             Relationship to Patient:           []  Parent    Responsible person                          []  Spouse  In case of minor or                    [] Other  _____________   In

## (undated) NOTE — MR AVS SNAPSHOT
[facility]  [address], [address] [address]  [address] [phone]               Thank you for choosing us for your health care visit with Dalila Scanlon.  Michelle House MD.  We are glad to serve you and happy to provide you with this summary of you Improve scapular mobilization and stabilization. Advance to rotator cuff strengthening and ROM. Cervical and thoracic mobilization and stabilization to improve scapular motion. Taping as needed.   HEP      Referral Orders      Normal Orders This Visit Biceps tendonitis on right [M75.21], Cervical radiculopathy [M54.12]          Reason for Today's Visit     Follow - Up           Medical Issues Discussed Today     Adhesive capsulitis of right shoulder    Biceps tendonitis on right    Cervical radiculopath not schedule the test until this office has notified you that the test has been approved by your insurer. Depending on your insurance carrier, approval may take 3-10 days.  It is highly recommended patients contact their insurance carrier directly to deter [email or web address]. If you've recently had a stay at the Hospital you can access your discharge instructions in [employer] by going to Visits < Admission Summaries.  If you've been to the Emergency Department or your doctor's office, you can view yo Visit [facility] online at  [email or web address]

## (undated) NOTE — LETTER
21      Patient: Marisela Tay  : 5/3/1965 Visit date: 2021    Dear Matias Wallace,      I examined your patient in consultation today.     He has a left thumb inner phalangeal joint radial deviation deformity secondary to severe ar